# Patient Record
Sex: FEMALE | Race: WHITE | Employment: OTHER | URBAN - METROPOLITAN AREA
[De-identification: names, ages, dates, MRNs, and addresses within clinical notes are randomized per-mention and may not be internally consistent; named-entity substitution may affect disease eponyms.]

---

## 2017-04-19 ENCOUNTER — OFFICE VISIT (OUTPATIENT)
Dept: INTERNAL MEDICINE CLINIC | Age: 82
End: 2017-04-19

## 2017-04-19 VITALS
DIASTOLIC BLOOD PRESSURE: 70 MMHG | TEMPERATURE: 98.6 F | OXYGEN SATURATION: 98 % | SYSTOLIC BLOOD PRESSURE: 120 MMHG | RESPIRATION RATE: 14 BRPM | BODY MASS INDEX: 26.84 KG/M2 | HEIGHT: 67 IN | WEIGHT: 171 LBS | HEART RATE: 76 BPM

## 2017-04-19 DIAGNOSIS — C77.3 BREAST CANCER METASTASIZED TO AXILLARY LYMPH NODE, RIGHT (HCC): ICD-10-CM

## 2017-04-19 DIAGNOSIS — E55.9 VITAMIN D DEFICIENCY: ICD-10-CM

## 2017-04-19 DIAGNOSIS — Z23 ENCOUNTER FOR IMMUNIZATION: ICD-10-CM

## 2017-04-19 DIAGNOSIS — Z23 NEED FOR TDAP VACCINATION: ICD-10-CM

## 2017-04-19 DIAGNOSIS — R73.9 HYPERGLYCEMIA: ICD-10-CM

## 2017-04-19 DIAGNOSIS — Z00.00 ROUTINE GENERAL MEDICAL EXAMINATION AT A HEALTH CARE FACILITY: Primary | ICD-10-CM

## 2017-04-19 DIAGNOSIS — M15.9 PRIMARY OSTEOARTHRITIS INVOLVING MULTIPLE JOINTS: ICD-10-CM

## 2017-04-19 DIAGNOSIS — I10 ESSENTIAL HYPERTENSION: ICD-10-CM

## 2017-04-19 DIAGNOSIS — Z13.39 SCREENING FOR ALCOHOLISM: ICD-10-CM

## 2017-04-19 DIAGNOSIS — C50.911 BREAST CANCER METASTASIZED TO AXILLARY LYMPH NODE, RIGHT (HCC): ICD-10-CM

## 2017-04-19 DIAGNOSIS — C54.1 ENDOMETRIAL CANCER (HCC): ICD-10-CM

## 2017-04-19 RX ORDER — PAROXETINE HYDROCHLORIDE 20 MG/1
TABLET, FILM COATED ORAL
Qty: 90 TAB | Refills: 3 | Status: SHIPPED | OUTPATIENT
Start: 2017-04-19 | End: 2017-11-22 | Stop reason: SDUPTHER

## 2017-04-19 NOTE — PROGRESS NOTES
This is a Subsequent Medicare Annual Wellness Visit providing Personalized Prevention Plan Services (PPPS) (Performed 12 months after initial AWV and PPPS )  As well as for followup of HTN, lipids, and OA. Some increase in anxiety and now taking 20 mg of paxil daily. Some recent right hip pain on the lateral side with lying down at night or climbing steps. I have reviewed the patient's medical history in detail and updated the computerized patient record. History     Past Medical History:   Diagnosis Date    Anxiety     Anxiety state, unspecified 5/3/2010    Atypical chest pain     Cancer ([facility] [address] 1998    endometrial cancer and breast    DJD (degenerative joint disease)     GERD (gastroesophageal reflux disease)     Hypertension     Nausea & vomiting     OA (osteoarthritis) 5/3/2010    Osteoarthritis of right hip 9/20/2012    PUD (peptic ulcer disease)     BLEEDING AFTER THR    Stenosis, spinal, lumbar       Past Surgical History:   Procedure Laterality Date    BREAST SURGERY PROCEDURE UNLISTED      lumpectomy 05/22/14    HX BLADDER SUSPENSION      HX CATARACT REMOVAL Bilateral     HX DILATION AND CURETTAGE  1998    HX GYN  2003    PELVIC FLOOR RECONSTRUCTION    HX HIP REPLACEMENT  2013    Right    HX ORTHOPAEDIC Right 2012    THR    HX OTHER SURGICAL  1999    A&P REPAIR    HX SHOULDER REPLACEMENT  2/2010    Left shoulder    HX MAXIMO AND BSO  1998    HX WISDOM TEETH EXTRACTION      VASCULAR SURGERY PROCEDURE UNLIST  1966    vein stripping left leg     Current Outpatient Prescriptions   Medication Sig Dispense Refill    valsartan (DIOVAN) 80 mg tablet Take 1 Tab by mouth daily. 90 Tab 3    PARoxetine (PAXIL) 20 mg tablet TAKE 1 TABLET DAILY (Patient taking differently: Take 10 mg by mouth daily. TAKE 1 TABLET DAILY) 90 Tab 3    letrozole (FEMARA) 2.5 mg tablet Take 1 Tab by mouth daily. 90 Tab 3    ibuprofen (MOTRIN) 200 mg tablet Take  by mouth as needed.       zoledronic acid (RECLAST) 5 mg/100 mL soln 5 mg by IntraVENous route Once a year.  cholecalciferol, vitamin D3, (VITAMIN D3) 2,000 unit tab Take 4,000 mcg by mouth daily.  Calcium-Cholecalciferol, D3, (CALCIUM 500 + D) 500 mg(1,250mg) -400 unit tab Take 1 Tab by mouth two (2) times a day.  docusate sodium (COLACE) 100 mg capsule Take 300 mg by mouth daily.  INULIN (FIBER GUMMIES PO) Take  by mouth. 2 gummies daily      cyanocobalamin (VITAMIN B-12) 1,000 mcg sublingual tablet Take 2,000 mcg by mouth daily.  multivitamin (ONE A DAY) tablet Take 2 Tabs by mouth daily.  traMADol (ULTRAM) 50 mg tablet Take 1 Tab by mouth every six (6) hours as needed for Pain. Max Daily Amount: 200 mg. 30 Tab 0    Bifidobacterium Infantis (ALIGN) 4 mg cap Take 1 Cap by mouth daily.        Allergies   Allergen Reactions    Oxycontin [Oxycodone] Hives    Celebrex [Celecoxib] Hives    Ciprofloxacin Rash    E-Mycin [Erythromycin] Rash    Keflex [Cephalexin] Rash    Morphine Rash    Pcn [Penicillins] Rash    Tetracycline Rash     Family History   Problem Relation Age of Onset    Stroke Mother     Lung Disease Mother     Heart Disease Father     Cancer Other      breast    Post-op Nausea/Vomiting Other     Anesth Problems Other      PONV    Other Grandchild      GENETIC DJD    No Known Problems Daughter      Social History   Substance Use Topics    Smoking status: Never Smoker    Smokeless tobacco: Never Used    Alcohol use 0.5 - 1.0 oz/week     1 - 2 Glasses of wine per week      Comment: \"VERY LITTLE\"     Patient Active Problem List   Diagnosis Code    Essential hypertension I10    Anxiety state, unspecified F41.1    OA (osteoarthritis) M19.90    Hyperglycemia R73.9    Osteoarthritis of right hip M16.11    Vitamin D deficiency E55.9    Endometrial cancer (HCC) C54.1    Breast cancer metastasized to axillary lymph node (HCC) C50.919, C77.3    Advance directive declined by patient Z78.9 Depression Risk Factor Screening:     PHQ 2 / 9, over the last two weeks 9/9/2016   Little interest or pleasure in doing things Not at all   Feeling down, depressed or hopeless Not at all   Total Score PHQ 2 0     Alcohol Risk Factor Screening: On any occasion during the past 3 months, have you had more than 3 drinks containing alcohol? No    Do you average more than 7 drinks per week? No      Functional Ability and Level of Safety:     Hearing Loss   normal-to-mild    Activities of Daily Living   Self-care. Requires assistance with: no ADLs    Fall Risk     Fall Risk Assessment, last 12 mths 9/9/2016   Able to walk? Yes   Fall in past 12 months? Yes   Fall with injury? No   Number of falls in past 12 months 1   Fall Risk Score 1     Abuse Screen   Patient is not abused    Review of Systems   A comprehensive review of systems was negative except for that written in the HPI. Physical Examination     Evaluation of Cognitive Function:  Mood/affect:  happy  Appearance: age appropriate  Family member/caregiver input: None    Visit Vitals    /70    Pulse 76    Temp 98.6 °F (37 °C) (Oral)    Resp 14    Ht 5' 6.5\" (1.689 m)    Wt 171 lb (77.6 kg)    SpO2 98%    BMI 27.19 kg/m2     General appearance: alert, cooperative, no distress, appears stated age  Head: Normocephalic, without obvious abnormality, atraumatic  Eyes: negative  Ears: normal TM's and external ear canals AU  Nose: Nares normal. Septum midline. Mucosa normal. No drainage or sinus tenderness. Throat: Lips, mucosa, and tongue normal. Teeth and gums normal  Neck: supple, symmetrical, trachea midline, no adenopathy, thyroid: not enlarged, symmetric, no tenderness/mass/nodules, no carotid bruit and no JVD  Back: symmetric, no curvature. ROM normal. No CVA tenderness. Lungs: clear to auscultation bilaterally  Heart: regular rate and rhythm, S1, S2 normal, no murmur, click, rub or gallop  Abdomen: soft, non-tender.  Bowel sounds normal. No masses,  no organomegaly  Extremities: extremities normal, atraumatic, no cyanosis or edema  Pulses: 2+ and symmetric  Lymph nodes: Cervical, supraclavicular, and axillary nodes normal.  Neurologic: Grossly normal  Tender over the right hip bursa, no redness or warmth. Normal ROM. Patient Care Team:  Noah Holman MD as PCP - Ruthy Trinh MD (Ophthalmology)  Gonzalez Carlton RN as Nurse Navigator (Internal Medicine)  Allison Escalante MD as Physician (Hematology and Oncology)  Vianca Lopez MD as Surgeon (General Surgery)  Santo Boo MD as Physician (Dermatology)  Patti Thompson MD as Surgeon (Gastroenterology)  Gerard Hartman MD as Physician (Orthopedic Surgery)  Lorraine Howard MD (Endocrinology)    Advice/Referrals/Counseling   Education and counseling provided:  Are appropriate based on today's review and evaluation  End-of-Life planning (with patient's consent)  Pneumococcal Vaccine  Diabetes screening test      Assessment/Plan   Nurys Da Silva was seen today for annual wellness visit. Diagnoses and all orders for this visit:    Routine general medical examination at a health care facility    Need for Tdap vaccination    Encounter for immunization    Screening for alcoholism    Endometrial cancer [facility])    Breast cancer metastasized to axillary lymph node, right - will see surgery for followup. Essential hypertension - BP well controlled. -     CBC WITH AUTOMATED DIFF  -     LIPID PANEL  -     METABOLIC PANEL, COMPREHENSIVE  -     TSH RFX ON ABNORMAL TO FREE T4  -     UA/M W/RFLX CULTURE, ROUTINE    Primary osteoarthritis involving multiple joints - now with bursitis . Will work on stretches and call if not improving for ortho to inject.      Hyperglycemia - check labs and continue to work on diet and exercise.   -     HEMOGLOBIN A1C WITH EAG    Vitamin D deficiency    Other orders  -     Diphth, Pertus,Acell,, Tetanus ([facility] Cruz) 2.5-8-5 Lf-mcg-Lf/0.5mL susp susp; 0.5 mL by IntraMUSCular route once for 1 dose. Indications: DIPHTHERIA-PERTUSSIS-TETANUS COMBINED PREVENTION  -     pneumococcal 13 adenike conj dip (PREVNAR-13) 0.5 mL syrg injection; 0.5 mL by IntraMUSCular route once for 1 dose. Indications: PREVENTION OF STREPTOCOCCUS PNEUMONIAE INFECTION  -     PARoxetine (PAXIL) 20 mg tablet; TAKE 1 TABLET DAILY       Follow-up Disposition: 6 months and as needed   Advised her to call back or return to office if symptoms worsen/change/persist.  Discussed expected course/resolution/complications of diagnosis in detail with patient. Medication risks/benefits/costs/interactions/alternatives discussed with patient. She was given an after visit summary which includes diagnoses, current medications, & vitals. She expressed understanding with the diagnosis and plan. Juanito Brennan

## 2017-04-19 NOTE — MR AVS SNAPSHOT
Visit Information Date & Time Provider Department Dept. Phone Encounter #  
 4/19/2017 10:30 AM Tanya Hicks MD [facility] Internal Medicine [phone] [id number] Upcoming Health Maintenance Date Due Pneumococcal 65+ High/Highest Risk (2 of 2 - PPSV23) 1/1/2012 DTaP/Tdap/Td series (1 - Tdap) 11/16/2012 MEDICARE YEARLY EXAM 9/18/2016 GLAUCOMA SCREENING Q2Y 11/19/2017 Allergies as of 4/19/2017  Review Complete On: 4/19/2017 By: Linda Roper LPN Severity Noted Reaction Type Reactions Oxycontin [Oxycodone] High 05/03/2010   Systemic Hives Celebrex [Celecoxib]  11/17/2009    Hives Ciprofloxacin  11/17/2009    Rash  
 E-mycin [Erythromycin]  11/17/2009    Rash Keflex [Cephalexin]  11/17/2009    Rash Morphine  11/17/2009    Rash Pcn [Penicillins]  11/17/2009    Rash Tetracycline  11/17/2009    Rash Current Immunizations  Reviewed on 4/19/2017 Name Date Influenza High Dose Vaccine PF 10/26/2016 Influenza Vaccine 11/1/2014, 10/1/2013 Influenza Vaccine Split 9/22/2012 12:41 PM  
 Pneumococcal Vaccine (Unspecified Type) 1/1/2007 TD Vaccine 11/15/2012 Reviewed by Tanya Hicks MD on 4/19/2017 at 11:10 AM  
You Were Diagnosed With   
  
 Codes Comments Routine general medical examination at a health care facility    -  Primary ICD-10-CM: Z00.00 ICD-9-CM: V70.0 Need for Tdap vaccination     ICD-10-CM: A88 ICD-9-CM: V06.1 Encounter for immunization     ICD-10-CM: H72 ICD-9-CM: V03.89 Screening for alcoholism     ICD-10-CM: Z13.89 ICD-9-CM: V79.1 Endometrial cancer ([facility] [address]     ICD-10-CM: C54.1 ICD-9-CM: 182.0 Breast cancer metastasized to axillary lymph node, right     ICD-10-CM: C50.911, C77.3 ICD-9-CM: 174.9, 196.3 Essential hypertension     ICD-10-CM: I10 
ICD-9-CM: 401.9 Primary osteoarthritis involving multiple joints     ICD-10-CM: M15.0 ICD-9-CM: 715.09   
 Hyperglycemia     ICD-10-CM: R73.9 ICD-9-CM: 790.29 Vitamin D deficiency     ICD-10-CM: E55.9 ICD-9-CM: 268.9 Vitals BP Pulse Temp Resp Height(growth percentile) Weight(growth percentile) 120/70 76 98.6 °F (37 °C) (Oral) 14 5' 6.5\" (1.689 m) 171 lb (77.6 kg) SpO2 BMI OB Status Smoking Status 98% 27.19 kg/m2 Hysterectomy Never Smoker Vitals History BMI and BSA Data Body Mass Index Body Surface Area  
 27.19 kg/m 2 1.91 m 2 Preferred Pharmacy Pharmacy Name Phone [facility] [address], [facility] [address] [phone] Your Updated Medication List  
  
   
This list is accurate as of: 4/19/17 11:21 AM.  Always use your most recent med list.  
  
  
  
  
 CALCIUM 500 + D 500 mg(1,250mg) -400 unit Tab Generic drug:  Calcium-Cholecalciferol (D3) Take 1 Tab by mouth two (2) times a day. COLACE 100 mg capsule Generic drug:  docusate sodium Take 300 mg by mouth daily. Diphth, Pertus(Acell), Tetanus 2.5-8-5 Lf-mcg-Lf/0.5mL Susp susp Commonly known as:  BOOSTRIX TDAP  
0.5 mL by IntraMUSCular route once for 1 dose. Indications: DIPHTHERIA-PERTUSSIS-TETANUS COMBINED PREVENTION FIBER GUMMIES PO Take  by mouth. 2 gummies daily  
  
 ibuprofen 200 mg tablet Commonly known as:  MOTRIN Take  by mouth as needed. letrozole 2.5 mg tablet Commonly known as:  [facility] Take 1 Tab by mouth daily. multivitamin tablet Commonly known as:  ONE A DAY Take 2 Tabs by mouth daily. PARoxetine 20 mg tablet Commonly known as:  PAXIL TAKE 1 TABLET DAILY pneumococcal 13 adenike conj dip 0.5 mL Syrg injection Commonly known as:  PREVNAR-13  
0.5 mL by IntraMUSCular route once for 1 dose. Indications: PREVENTION OF STREPTOCOCCUS PNEUMONIAE INFECTION  
  
 valsartan 80 mg tablet Commonly known as:  DIOVAN Take 1 Tab by mouth daily. VITAMIN B-12 1,000 mcg sublingual tablet Generic drug:  cyanocobalamin Take 2,000 mcg by mouth daily. VITAMIN D3 2,000 unit Tab Generic drug:  cholecalciferol (vitamin D3) Take 4,000 mcg by mouth daily. zoledronic acid 5 mg/100 mL Soln Commonly known as:  RECLAST 5 mg by IntraVENous route Once a year. Prescriptions Printed Refills Galina Vigil,, Tetanus (BOOSTRIX TDAP) 2.5-8-5 Lf-mcg-Lf/0.5mL susp susp 0 Si.5 mL by IntraMUSCular route once for 1 dose. Indications: DIPHTHERIA-PERTUSSIS-TETANUS COMBINED PREVENTION Class: Print Route: IntraMUSCular  
 pneumococcal 13 adenike conj dip (PREVNAR-13) 0.5 mL syrg injection 0 Si.5 mL by IntraMUSCular route once for 1 dose. Indications: PREVENTION OF STREPTOCOCCUS PNEUMONIAE INFECTION Class: Print Route: IntraMUSCular Prescriptions Sent to Pharmacy Refills PARoxetine (PAXIL) 20 mg tablet 3 Sig: TAKE 1 TABLET DAILY Class: Normal  
 Pharmacy: [facility] [address] [place] [address] AT [address] #: [phone] We Performed the Following CBC WITH AUTOMATED DIFF [15448 CPT(R)] HEMOGLOBIN A1C WITH EAG [14430 CPT(R)] LIPID PANEL [39003 CPT(R)] METABOLIC PANEL, COMPREHENSIVE [77824 CPT(R)] TSH RFX ON ABNORMAL TO FREE T4 [[id number] Custom] UA/M W/RFLX CULTURE, ROUTINE [[id number] Custom] Patient Instructions Please remember to bring a copy of your advance medical directive with you to your next appointment so that we may update your chart with this important information. Thank you. Medicare Wellness Visit, Female The best way to live healthy is to have a healthy lifestyle by eating a well-balanced diet, exercising regularly, limiting alcohol and stopping smoking.  
 
Regular physical exams and screening tests are another way to keep healthy. Preventive exams provided by your health care provider can find health problems before they become diseases or illnesses. Preventive services including immunizations, screening tests, monitoring and exams can help you take care of your own health. All people over age 72 should have a pneumovax  and and a prevnar shot to prevent pneumonia. These are once in a lifetime unless you and your provider decide differently. All people over 65 should have a yearly flu shot and a tetanus vaccine every 10 years. A bone mass density to screen for osteoporosis or thinning of the bones should be done every 2 years after 65. Screening for diabetes mellitus with a blood sugar test should be done every year. Glaucoma is a disease of the eye due to increased ocular pressure that can lead to blindness and it should be done every year by an eye professional. 
 
Cardiovascular screening tests that check for elevated lipids (fatty part of blood) which can lead to heart disease and strokes should be done every 5 years. Colorectal screening that evaluates for blood or polyps in your colon should be done yearly as a stool test or every five years as a flexible sigmoidoscope or every 10 years as a colonoscopy up to age 76. Breast cancer screening with a mammogram is recommended biennially  for women age 54-69. Screening for cervical cancer with a pap smear and pelvic exam is recommended for women after age 72 years every 2 years up to age 79 or when the provider and patient decide to stop. If there is a history of cervical abnormalities or other increased risk for cancer then the test is recommended yearly. Hepatitis C screening is also recommended for anyone born between [birth year] through [address]. A shingles vaccine is also recommended once in a lifetime after age 61. Your Medicare Wellness Exam is recommended annually. Here is a list of your current Health Maintenance items with a due date: Health Maintenance Due Topic Date Due  Pneumococcal Vaccine (2 of 2 - PPSV23) 01/01/2012  DTaP/Tdap/Td  (1 - Tdap) 11/16/2012 [address] [place] [address] Annual Well Visit  09/18/2016 Hip Bursitis: Care Instructions Your Care Instructions Bursitis is inflammation of the bursa. A bursa is a small sac of fluid that cushions a joint and helps it move easily. A bursa sits between a bone in the hip and the muscles and tendons in the thigh and buttock. Injury or overuse of the hip can cause bursitis. Activities that can lead to bursitis include twisting and rapid joint movement. Bursitis can cause hip pain. Bursitis usually gets better if you avoid the activity that caused it. If pain lasts or gets worse despite home treatment, your doctor may draw fluid from the bursa through a needle. This may relieve your pain and help your doctor know if you have an infection. If so, your doctor will prescribe antibiotics. If you have inflammation only, you may get a corticosteroid shot to reduce swelling and pain. Sometimes surgery is needed to drain or remove the bursa. Follow-up care is a key part of your treatment and safety. Be sure to make and go to all appointments, and call your doctor if you are having problems. It's also a good idea to know your test results and keep a list of the medicines you take. How can you care for yourself at home? · Put ice or a cold pack on your hip for 10 to 20 minutes at a time. Put a thin cloth between the ice and your skin. · After 3 days of using ice, you may use heat on your hip. You can use a hot water bottle, a heating pad set on low, or a warm, moist towel. · Rest your hip. Stop any activities that cause pain. Switch to activities that do not stress your hip. · Take your medicines exactly as prescribed. Call your doctor if you think you are having a problem with your medicine.  
· Ask your doctor if you can take an over-the-counter pain medicine, such as acetaminophen (Tylenol), ibuprofen (Advil, Motrin), or naproxen (Aleve). Be safe with medicines. Read and follow all instructions on the label. · To prevent stiffness, gently move the hip joint as much as you can without pain every day. As the pain gets better, keep doing range-of-motion exercises. Ask your doctor for exercises that will make the muscles around the hip joint stronger. Do these as directed. · You can slowly return to the activity that caused the pain, but do it with less effort until you can do it without pain or swelling. Be sure to warm up before and stretch after you do the activity. When should you call for help? Call your doctor now or seek immediate medical care if: 
· You have a fever. · You have increased swelling or redness in your hip. · You cannot use your hip, or the pain in your hip gets worse. Watch closely for changes in your health, and be sure to contact your doctor if: 
· You have pain for 2 weeks or longer despite home treatment. Where can you learn more? Go to http://daniele-elmer.info/. Enter C529 in the search box to learn more about \"Hip Bursitis: Care Instructions. \" Current as of: May 23, 2016 Content Version: 11.2 © 1830-2772 [employer]. Care instructions adapted under license by [employer] (which disclaims liability or warranty for this information). If you have questions about a medical condition or this instruction, always ask your healthcare professional. [place] [address] any warranty or liability for your use of this information. Hip Bursitis: Exercises Your Care Instructions Here are some examples of typical rehabilitation exercises for your condition. Start each exercise slowly. Ease off the exercise if you start to have pain. Your doctor or physical therapist will tell you when you can start these exercises and which ones will work best for you. How to do the exercises Hip rotator stretch 1. Lie on your back with both knees bent and your feet flat on the floor. 2. Put the ankle of your affected leg on your opposite thigh near your knee. 3. Use your hand to gently push your knee away from your body until you feel a gentle stretch around your hip. 4. Hold the stretch for 15 to 30 seconds. 5. Repeat 2 to 4 times. 6. Repeat steps 1 through 5, but this time use your hand to gently pull your knee toward your opposite shoulder. Iliotibial band stretch 1. Lean sideways against a wall. If you are not steady on your feet, hold on to a chair or counter. 2. Stand on the leg with the affected hip, with that leg close to the wall. Then cross your other leg in front of it. 3. Let your affected hip drop out to the side of your body and against wall. Then lean away from your affected hip until you feel a stretch. 4. Hold the stretch for 15 to 30 seconds. 5. Repeat 2 to 4 times. Straight-leg raises to the outside 1. Lie on your side, with your affected hip on top. 2. Tighten the front thigh muscles of your top leg to keep your knee straight. 3. Keep your hip and your leg straight in line with the rest of your body, and keep your knee pointing forward. Do not drop your hip back. 4. Lift your top leg straight up toward the ceiling, about 12 inches off the floor. Hold for about 6 seconds, then slowly lower your leg. 5. Repeat 8 to 12 times. Clamshell 1. Lie on your side, with your affected hip on top and your head propped on a pillow. Keep your feet and knees together and your knees bent. 2. Raise your top knee, but keep your feet together. Do not let your hips roll back. Your legs should open up like a clamshell. 3. Hold for 6 seconds. 4. Slowly lower your knee back down. Rest for 10 seconds. 5. Repeat 8 to 12 times. Follow-up care is a key part of your treatment and safety.  Be sure to make and go to all appointments, and call your doctor if you are having problems. It's also a good idea to know your test results and keep a list of the medicines you take. Where can you learn more? Go to http://daniele-elmer.info/. Enter B747 in the search box to learn more about \"Hip Bursitis: Exercises. \" Current as of: May 23, 2016 Content Version: 11.2 © 0971-3944 [employer]. Care instructions adapted under license by [employer] (which disclaims liability or warranty for this information). If you have questions about a medical condition or this instruction, always ask your healthcare professional. [address] any warranty or liability for your use of this information. Introducing [facility] & HEALTH SERVICES! Dear Claudia Lee: Thank you for requesting a Call Britannia account. Our records indicate that you already have an active Call Britannia account. You can access your account anytime at [email or web address] Did you know that you can access your hospital and ER discharge instructions at any time in Call Britannia? You can also review all of your test results from your hospital stay or ER visit. Additional Information If you have questions, please visit the Frequently Asked Questions section of the Call Britannia website at [email or web address] Remember, Call Britannia is NOT to be used for urgent needs. For medical emergencies, dial 911. Now available from your iPhone and Android! Please provide this summary of care documentation to your next provider. Your primary care clinician is listed as [id number] If you have any questions after today's visit, please call [phone].

## 2017-04-19 NOTE — PATIENT INSTRUCTIONS
Please remember to bring a copy of your advance medical directive with you to your next appointment so that we may update your chart with this important information. Thank you. Medicare Wellness Visit, Female    The best way to live healthy is to have a healthy lifestyle by eating a well-balanced diet, exercising regularly, limiting alcohol and stopping smoking. Regular physical exams and screening tests are another way to keep healthy. Preventive exams provided by your health care provider can find health problems before they become diseases or illnesses. Preventive services including immunizations, screening tests, monitoring and exams can help you take care of your own health. All people over age 72 should have a pneumovax  and and a prevnar shot to prevent pneumonia. These are once in a lifetime unless you and your provider decide differently. All people over 65 should have a yearly flu shot and a tetanus vaccine every 10 years. A bone mass density to screen for osteoporosis or thinning of the bones should be done every 2 years after 65. Screening for diabetes mellitus with a blood sugar test should be done every year. Glaucoma is a disease of the eye due to increased ocular pressure that can lead to blindness and it should be done every year by an eye professional.    Cardiovascular screening tests that check for elevated lipids (fatty part of blood) which can lead to heart disease and strokes should be done every 5 years. Colorectal screening that evaluates for blood or polyps in your colon should be done yearly as a stool test or every five years as a flexible sigmoidoscope or every 10 years as a colonoscopy up to age 76. Breast cancer screening with a mammogram is recommended biennially  for women age 54-69.     Screening for cervical cancer with a pap smear and pelvic exam is recommended for women after age 72 years every 2 years up to age 79 or when the provider and patient decide to stop. If there is a history of cervical abnormalities or other increased risk for cancer then the test is recommended yearly. Hepatitis C screening is also recommended for anyone born between [birth year] through [address]. A shingles vaccine is also recommended once in a lifetime after age 61. Your Medicare Wellness Exam is recommended annually. Here is a list of your current Health Maintenance items with a due date:  Health Maintenance Due   Topic Date Due    Pneumococcal Vaccine (2 of 2 - PPSV23) 01/01/2012    DTaP/Tdap/Td  (1 - Tdap) 11/16/2012    Annual Well Visit  09/18/2016          Hip Bursitis: Care Instructions  Your Care Instructions    Bursitis is inflammation of the bursa. A bursa is a small sac of fluid that cushions a joint and helps it move easily. A bursa sits between a bone in the hip and the muscles and tendons in the thigh and buttock. Injury or overuse of the hip can cause bursitis. Activities that can lead to bursitis include twisting and rapid joint movement. Bursitis can cause hip pain. Bursitis usually gets better if you avoid the activity that caused it. If pain lasts or gets worse despite home treatment, your doctor may draw fluid from the bursa through a needle. This may relieve your pain and help your doctor know if you have an infection. If so, your doctor will prescribe antibiotics. If you have inflammation only, you may get a corticosteroid shot to reduce swelling and pain. Sometimes surgery is needed to drain or remove the bursa. Follow-up care is a key part of your treatment and safety. Be sure to make and go to all appointments, and call your doctor if you are having problems. It's also a good idea to know your test results and keep a list of the medicines you take. How can you care for yourself at home? · Put ice or a cold pack on your hip for 10 to 20 minutes at a time. Put a thin cloth between the ice and your skin.   · After 3 days of using ice, you may use heat on your hip. You can use a hot water bottle, a heating pad set on low, or a warm, moist towel. · Rest your hip. Stop any activities that cause pain. Switch to activities that do not stress your hip. · Take your medicines exactly as prescribed. Call your doctor if you think you are having a problem with your medicine. · Ask your doctor if you can take an over-the-counter pain medicine, such as acetaminophen (Tylenol), ibuprofen (Advil, Motrin), or naproxen (Aleve). Be safe with medicines. Read and follow all instructions on the label. · To prevent stiffness, gently move the hip joint as much as you can without pain every day. As the pain gets better, keep doing range-of-motion exercises. Ask your doctor for exercises that will make the muscles around the hip joint stronger. Do these as directed. · You can slowly return to the activity that caused the pain, but do it with less effort until you can do it without pain or swelling. Be sure to warm up before and stretch after you do the activity. When should you call for help? Call your doctor now or seek immediate medical care if:  · You have a fever. · You have increased swelling or redness in your hip. · You cannot use your hip, or the pain in your hip gets worse. Watch closely for changes in your health, and be sure to contact your doctor if:  · You have pain for 2 weeks or longer despite home treatment. Where can you learn more? Go to http://daniele-elmer.info/. Enter J166 in the search box to learn more about \"Hip Bursitis: Care Instructions. \"  Current as of: May 23, 2016  Content Version: 11.2  © 9292-0173 [employer]. Care instructions adapted under license by [employer] (which disclaims liability or warranty for this information).  If you have questions about a medical condition or this instruction, always ask your healthcare professional. [address] any warranty or liability for your use of this information. Hip Bursitis: Exercises  Your Care Instructions  Here are some examples of typical rehabilitation exercises for your condition. Start each exercise slowly. Ease off the exercise if you start to have pain. Your doctor or physical therapist will tell you when you can start these exercises and which ones will work best for you. How to do the exercises  Hip rotator stretch    1. Lie on your back with both knees bent and your feet flat on the floor. 2. Put the ankle of your affected leg on your opposite thigh near your knee. 3. Use your hand to gently push your knee away from your body until you feel a gentle stretch around your hip. 4. Hold the stretch for 15 to 30 seconds. 5. Repeat 2 to 4 times. 6. Repeat steps 1 through 5, but this time use your hand to gently pull your knee toward your opposite shoulder. Iliotibial band stretch    1. Lean sideways against a wall. If you are not steady on your feet, hold on to a chair or counter. 2. Stand on the leg with the affected hip, with that leg close to the wall. Then cross your other leg in front of it. 3. Let your affected hip drop out to the side of your body and against wall. Then lean away from your affected hip until you feel a stretch. 4. Hold the stretch for 15 to 30 seconds. 5. Repeat 2 to 4 times. Straight-leg raises to the outside    1. Lie on your side, with your affected hip on top. 2. Tighten the front thigh muscles of your top leg to keep your knee straight. 3. Keep your hip and your leg straight in line with the rest of your body, and keep your knee pointing forward. Do not drop your hip back. 4. Lift your top leg straight up toward the ceiling, about 12 inches off the floor. Hold for about 6 seconds, then slowly lower your leg. 5. Repeat 8 to 12 times. Clamshell    1. Lie on your side, with your affected hip on top and your head propped on a pillow.  Keep your feet and knees together and your knees bent.  2. Raise your top knee, but keep your feet together. Do not let your hips roll back. Your legs should open up like a clamshell. 3. Hold for 6 seconds. 4. Slowly lower your knee back down. Rest for 10 seconds. 5. Repeat 8 to 12 times. Follow-up care is a key part of your treatment and safety. Be sure to make and go to all appointments, and call your doctor if you are having problems. It's also a good idea to know your test results and keep a list of the medicines you take. Where can you learn more? Go to http://daniele-elmer.info/. Enter O380 in the search box to learn more about \"Hip Bursitis: Exercises. \"  Current as of: May 23, 2016  Content Version: 11.2  © 9093-5504 [employer]. Care instructions adapted under license by [employer] (which disclaims liability or warranty for this information). If you have questions about a medical condition or this instruction, always ask your healthcare professional. [address] any warranty or liability for your use of this information.

## 2017-04-19 NOTE — PROGRESS NOTES
Chief Complaint   Patient presents with   [facility] Annual Wellness Visit     Goals that were addressed/or need to be completed after this visit:  Health Maintenance Due   Topic    Pneumococcal 65+ High/Highest Risk (2 of 2 - PPSV23)    DTaP/Tdap/Td series (1 - Tdap)    MEDICARE YEARLY EXAM

## 2017-04-26 ENCOUNTER — HOSPITAL ENCOUNTER (OUTPATIENT)
Dept: LAB | Age: 82
Discharge: HOME | End: 2017-04-26
Payer: MEDICARE

## 2017-04-26 PROCEDURE — 80053 COMPREHEN METABOLIC PANEL: CPT

## 2017-04-26 PROCEDURE — 80061 LIPID PANEL: CPT

## 2017-04-26 PROCEDURE — 84443 ASSAY THYROID STIM HORMONE: CPT

## 2017-04-26 PROCEDURE — 87086 URINE CULTURE/COLONY COUNT: CPT

## 2017-04-26 PROCEDURE — 36415 COLL VENOUS BLD VENIPUNCTURE: CPT

## 2017-04-26 PROCEDURE — 83036 HEMOGLOBIN GLYCOSYLATED A1C: CPT

## 2017-04-26 PROCEDURE — 85025 COMPLETE CBC W/AUTO DIFF WBC: CPT

## 2017-04-26 PROCEDURE — 81001 URINALYSIS AUTO W/SCOPE: CPT

## 2017-04-28 LAB
ALBUMIN SERPL-MCNC: 4.4 G/DL (ref 3.5–4.7)
ALBUMIN/GLOB SERPL: 1.6 {RATIO} (ref 1.2–2.2)
ALP SERPL-CCNC: 63 IU/L (ref 39–117)
ALT SERPL-CCNC: 15 IU/L (ref 0–32)
APPEARANCE UR: CLEAR
AST SERPL-CCNC: 22 IU/L (ref 0–40)
BACTERIA #/AREA URNS HPF: NORMAL /[HPF]
BACTERIA UR CULT: NO GROWTH
BASOPHILS # BLD AUTO: 0 X10E3/UL (ref 0–0.2)
BASOPHILS NFR BLD AUTO: 0 %
BILIRUB SERPL-MCNC: 0.4 MG/DL (ref 0–1.2)
BILIRUB UR QL STRIP: NEGATIVE
BUN SERPL-MCNC: 12 MG/DL (ref 8–27)
BUN/CREAT SERPL: 14 (ref 12–28)
CALCIUM SERPL-MCNC: 9 MG/DL (ref 8.7–10.3)
CASTS URNS QL MICRO: NORMAL /LPF
CHLORIDE SERPL-SCNC: 99 MMOL/L (ref 96–106)
CHOLEST SERPL-MCNC: 178 MG/DL (ref 100–199)
CO2 SERPL-SCNC: 22 MMOL/L (ref 18–29)
COLOR UR: YELLOW
CREAT SERPL-MCNC: 0.84 MG/DL (ref 0.57–1)
EOSINOPHIL # BLD AUTO: 0.1 X10E3/UL (ref 0–0.4)
EOSINOPHIL NFR BLD AUTO: 2 %
EPI CELLS #/AREA URNS HPF: NORMAL /HPF
ERYTHROCYTE [DISTWIDTH] IN BLOOD BY AUTOMATED COUNT: 14.7 % (ref 12.3–15.4)
EST. AVERAGE GLUCOSE BLD GHB EST-MCNC: 126 MG/DL
GLOBULIN SER CALC-MCNC: 2.7 G/DL (ref 1.5–4.5)
GLUCOSE SERPL-MCNC: 94 MG/DL (ref 65–99)
GLUCOSE UR QL: NEGATIVE
HBA1C MFR BLD: 6 % (ref 4.8–5.6)
HCT VFR BLD AUTO: 36.4 % (ref 34–46.6)
HDLC SERPL-MCNC: 67 MG/DL
HGB BLD-MCNC: 12.3 G/DL (ref 11.1–15.9)
HGB UR QL STRIP: ABNORMAL
IMM GRANULOCYTES # BLD: 0 X10E3/UL (ref 0–0.1)
IMM GRANULOCYTES NFR BLD: 0 %
KETONES UR QL STRIP: NEGATIVE
LDLC SERPL CALC-MCNC: 92 MG/DL (ref 0–99)
LEUKOCYTE ESTERASE UR QL STRIP: ABNORMAL
LYMPHOCYTES # BLD AUTO: 1.4 X10E3/UL (ref 0.7–3.1)
LYMPHOCYTES NFR BLD AUTO: 29 %
MCH RBC QN AUTO: 29.2 PG (ref 26.6–33)
MCHC RBC AUTO-ENTMCNC: 33.8 G/DL (ref 31.5–35.7)
MCV RBC AUTO: 87 FL (ref 79–97)
MICRO URNS: ABNORMAL
MONOCYTES # BLD AUTO: 0.5 X10E3/UL (ref 0.1–0.9)
MONOCYTES NFR BLD AUTO: 9 %
MUCOUS THREADS URNS QL MICRO: PRESENT
NEUTROPHILS # BLD AUTO: 2.9 X10E3/UL (ref 1.4–7)
NEUTROPHILS NFR BLD AUTO: 60 %
NITRITE UR QL STRIP: NEGATIVE
PH UR STRIP: 6.5 [PH] (ref 5–7.5)
PLATELET # BLD AUTO: 286 X10E3/UL (ref 150–379)
POTASSIUM SERPL-SCNC: 4.4 MMOL/L (ref 3.5–5.2)
PROT SERPL-MCNC: 7.1 G/DL (ref 6–8.5)
PROT UR QL STRIP: NEGATIVE
RBC # BLD AUTO: 4.21 X10E6/UL (ref 3.77–5.28)
RBC #/AREA URNS HPF: NORMAL /HPF
SODIUM SERPL-SCNC: 137 MMOL/L (ref 134–144)
SP GR UR: 1.01 (ref 1–1.03)
TRIGL SERPL-MCNC: 96 MG/DL (ref 0–149)
TSH SERPL DL<=0.005 MIU/L-ACNC: 2.33 UIU/ML (ref 0.45–4.5)
URINALYSIS REFLEX, 377202: ABNORMAL
UROBILINOGEN UR STRIP-MCNC: 0.2 MG/DL (ref 0.2–1)
VLDLC SERPL CALC-MCNC: 19 MG/DL (ref 5–40)
WBC # BLD AUTO: 4.9 X10E3/UL (ref 3.4–10.8)
WBC #/AREA URNS HPF: NORMAL /HPF

## 2017-08-21 ENCOUNTER — TELEPHONE (OUTPATIENT)
Dept: INTERNAL MEDICINE CLINIC | Age: 82
End: 2017-08-21

## 2017-09-27 RX ORDER — OMEPRAZOLE 20 MG/1
CAPSULE, DELAYED RELEASE ORAL
Qty: 90 CAP | Refills: 3 | Status: SHIPPED | OUTPATIENT
Start: 2017-09-27 | End: 2018-09-22 | Stop reason: SDUPTHER

## 2017-11-22 RX ORDER — PAROXETINE HYDROCHLORIDE 20 MG/1
TABLET, FILM COATED ORAL
Qty: 90 TAB | Refills: 3 | Status: SHIPPED | OUTPATIENT
Start: 2017-11-22 | End: 2018-12-19 | Stop reason: SDUPTHER

## 2017-12-08 RX ORDER — VALSARTAN 80 MG/1
TABLET ORAL
Qty: 90 TAB | Refills: 3 | Status: SHIPPED | OUTPATIENT
Start: 2017-12-08 | End: 2020-09-11 | Stop reason: ALTCHOICE

## 2018-09-23 RX ORDER — OMEPRAZOLE 20 MG/1
CAPSULE, DELAYED RELEASE ORAL
Qty: 90 CAP | Refills: 3 | Status: SHIPPED | OUTPATIENT
Start: 2018-09-23 | End: 2021-09-02 | Stop reason: ALTCHOICE

## 2018-12-19 ENCOUNTER — OFFICE VISIT (OUTPATIENT)
Dept: INTERNAL MEDICINE CLINIC | Age: 83
End: 2018-12-19

## 2018-12-19 VITALS
SYSTOLIC BLOOD PRESSURE: 101 MMHG | RESPIRATION RATE: 16 BRPM | BODY MASS INDEX: 27.64 KG/M2 | DIASTOLIC BLOOD PRESSURE: 69 MMHG | HEIGHT: 66 IN | WEIGHT: 172 LBS | OXYGEN SATURATION: 96 % | HEART RATE: 98 BPM

## 2018-12-19 DIAGNOSIS — R41.3 MEMORY LOSS: ICD-10-CM

## 2018-12-19 DIAGNOSIS — M15.9 PRIMARY OSTEOARTHRITIS INVOLVING MULTIPLE JOINTS: ICD-10-CM

## 2018-12-19 DIAGNOSIS — Z00.00 MEDICARE ANNUAL WELLNESS VISIT, SUBSEQUENT: Primary | ICD-10-CM

## 2018-12-19 DIAGNOSIS — Z71.89 ADVANCE CARE PLANNING: ICD-10-CM

## 2018-12-19 DIAGNOSIS — C54.1 ENDOMETRIAL CANCER (HCC): ICD-10-CM

## 2018-12-19 DIAGNOSIS — I10 ESSENTIAL HYPERTENSION: ICD-10-CM

## 2018-12-19 DIAGNOSIS — Z13.39 SCREENING FOR ALCOHOLISM: ICD-10-CM

## 2018-12-19 DIAGNOSIS — C77.3 CARCINOMA OF RIGHT BREAST METASTATIC TO AXILLARY LYMPH NODE (HCC): ICD-10-CM

## 2018-12-19 DIAGNOSIS — R73.9 HYPERGLYCEMIA: ICD-10-CM

## 2018-12-19 DIAGNOSIS — Z13.31 SCREENING FOR DEPRESSION: ICD-10-CM

## 2018-12-19 DIAGNOSIS — C50.911 CARCINOMA OF RIGHT BREAST METASTATIC TO AXILLARY LYMPH NODE (HCC): ICD-10-CM

## 2018-12-19 RX ORDER — DICLOFENAC SODIUM AND MISOPROSTOL 50; 200 MG/1; UG/1
1 TABLET, DELAYED RELEASE ORAL 2 TIMES DAILY
Qty: 60 TAB | Refills: 3 | Status: SHIPPED | OUTPATIENT
Start: 2018-12-19 | End: 2021-09-02 | Stop reason: ALTCHOICE

## 2018-12-19 RX ORDER — PAROXETINE HYDROCHLORIDE 20 MG/1
TABLET, FILM COATED ORAL
Qty: 90 TAB | Refills: 3 | Status: SHIPPED | OUTPATIENT
Start: 2018-12-19 | End: 2019-06-18 | Stop reason: SDUPTHER

## 2018-12-19 NOTE — PATIENT INSTRUCTIONS
Today you had a Medicare Wellness Visit. During this visit, we developed and/or updated your personalized health plan to prevent disease and disability based on your current health and risk factors. Please schedule an appt around this time next year so we can continue to keep you on the right path to living a healthy lifestyle. Schedule of Personalized Health Plan    The best way to stay healthy is to live a healthy lifestyle. A healthy lifestyle includes regular exercise, eating a well-balanced diet, keeping a healthy weight and not smoking. Regular physical exams and screening tests are another important way to take care of yourself. Preventive exams provided by health care providers can find health problems early when treatment works best and can keep you from getting certain diseases or illnesses. Preventive services include exams, lab tests, screenings, shots, monitoring and information to help you take care of your own health. All people over 65 should have a pneumonia shot. Pneumonia shots are usually only needed once in a lifetime unless your doctor decides differently. All people over 65 should have a yearly flu shot. People over 65 are at medium to high risk for Hepatitis B. Three shots are needed for complete protection. For additional information, please discuss with physician. In addition to your physical exam, some screening tests are recommended:    Bone mass measurement (dexa scan) is recommended every  two years. Diabetes Mellitus screening is recommended every year. Glaucoma is an eye disease caused by high pressure in the eye. An eye exam is recommended every year. Cardiovascular screening tests that check your cholesterol and other blood fat (lipid) levels are recommended every five years. Colorectal Cancer screening tests help to find pre-cancerous polyps (growths in the colon) so they can be removed before they turn into cancer.   Tests ordered for screening depend on your personal and family history risk factors. Mammogram screening for Breast Cancer is recommended yearly. Screening for Cervical Cancer is recommended every two years (annually for certain risk factors, such as previous history of STD or abnormal PAP in past 7 years).     Here is a list of your current Health Maintenance items with a due date:  Health Maintenance   Topic Date Due    Shingrix Vaccine Age 49> (1 of 2) 09/12/1986    Pneumococcal 65+ High/Highest Risk (2 of 2 - PPSV23) 01/01/2012    DTaP/Tdap/Td series (1 - Tdap) 11/16/2012    GLAUCOMA SCREENING Q2Y  11/19/2017    MEDICARE YEARLY EXAM  04/20/2018    Influenza Age 9 to Adult  08/01/2018    Bone Densitometry (Dexa) Screening  Completed

## 2018-12-19 NOTE — PROGRESS NOTES
Apryl Gramajo is a 81 y.o. female and presents for Annual Medicare Wellness Visit. Assessment of cognitive impairment: Alert and oriented x 3. Abuse Screen:    Abuse Screening Questionnaire 12/19/2018   Do you ever feel afraid of your partner? N   Are you in a relationship with someone who physically or mentally threatens you? N   Is it safe for you to go home? Y       Depression Screen:   PHQ over the last two weeks 12/19/2018   Little interest or pleasure in doing things Not at all   Feeling down, depressed, irritable, or hopeless Not at all   Total Score PHQ 2 0       Fall Risk Assessment:    Fall Risk Assessment, last 12 mths 12/19/2018   Able to walk? Yes   Fall in past 12 months? No   Fall with injury? -   Number of falls in past 12 months -   Fall Risk Score -       Activities of Daily Living:    ADL Assessment 12/19/2018   Feeding yourself No Help Needed   Getting from bed to chair No Help Needed   Getting dressed No Help Needed   Bathing or showering No Help Needed   Walk across the room (includes cane/walker) No Help Needed   Using the telphone No Help Needed   Taking your medications No Help Needed   Preparing meals No Help Needed   Managing money (expenses/bills) No Help Needed   Moderately strenuous housework (laundry) No Help Needed   Shopping for personal items (toiletries/medicines) No Help Needed   Shopping for groceries No Help Needed   Driving No Help Needed   Climbing a flight of stairs No Help Needed   Getting to places beyond walking distances No Help Needed       Health Maintenance:  Daily Low Dose Aspirin: no  Bone Density: handled by endocrinology, UTD  Glaucoma Screening: UTD, records requested from [address] [facility] [address]:    Tetanus: TD 11/15/12. Influenza: up to date Oct 2018. Shingles:  Zostavax: unknown. Shingrix:patient declines   Pneumovax:  up to date 2007. Prevnar: up to date records requested from [address] [place] [address].   Cancer screening:    Cervical: NA.  Breast: up to date 2018 records requested from [facility]. Colon: NA.  Prostate:  NA    Advance Care Planning:   End of Life Planning: has an advanced directive - a copy HAS NOT been provided. .  Provided pt with \"Respecting Choices packet of Information\" no  Offered facilitator session with NN no     Medications/Allergies: Reviewed with patient  Prior to Admission medications    Medication Sig Start Date End Date Taking? Authorizing Provider   PARoxetine (PAXIL) 20 mg tablet TAKE 1 TABLET DAILY 12/19/18  Yes Fermin Best MD   diclofenac-miSOPROStol (ARTHROTEC 50)  mg-mcg per tablet Take 1 Tab by mouth two (2) times a day. 12/19/18  Yes Fermin Best MD   omeprazole (PRILOSEC) 20 mg capsule TAKE 1 CAPSULE DAILY 9/23/18  Yes Fermin Best MD   valsartan (DIOVAN) 80 mg tablet TAKE 1 TABLET DAILY 12/8/17  Yes Simin Theodore III, MD   letrozole [facility]) 2.5 mg tablet Take 1 Tab by mouth daily. 10/20/16  Yes Fermin Best MD   zoledronic acid (RECLAST) 5 mg/100 mL soln 5 mg by IntraVENous route Once a year. Yes Provider, Historical   cholecalciferol, vitamin D3, (VITAMIN D3) 2,000 unit tab Take 4,000 mcg by mouth daily. Yes Provider, Historical   INULIN (FIBER GUMMIES PO) Take  by mouth. 2 gummies daily   Yes Provider, Historical   cyanocobalamin (VITAMIN B-12) 1,000 mcg sublingual tablet Take 2,000 mcg by mouth daily. Yes Provider, Historical   multivitamin (ONE A DAY) tablet Take 2 Tabs by mouth daily. Yes Provider, Historical   Calcium-Cholecalciferol, D3, (CALCIUM 500 + D) 500 mg(1,250mg) -400 unit tab Take 1 Tab by mouth two (2) times a day. 9/18/15   Simin Theodore III, MD   docusate sodium (COLACE) 100 mg capsule Take 300 mg by mouth daily.     Provider, Historical     Allergies   Allergen Reactions    Oxycontin [Oxycodone] Hives    Celebrex [Celecoxib] Hives    Ciprofloxacin Rash    E-Mycin [Erythromycin] Rash    Keflex [Cephalexin] Rash    Morphine Rash    Pcn [Penicillins] Rash    Tetracycline Rash       PSH: Reviewed with patient  Past Surgical History:   Procedure Laterality Date    BREAST SURGERY PROCEDURE UNLISTED      lumpectomy 05/22/14    HX BLADDER SUSPENSION      HX CATARACT REMOVAL Bilateral     HX DILATION AND CURETTAGE  1998    HX GYN  2003    PELVIC FLOOR RECONSTRUCTION    HX HIP REPLACEMENT  2013    Right    HX ORTHOPAEDIC Right 2012    THR    HX OTHER SURGICAL  1999    A&P REPAIR    HX SHOULDER REPLACEMENT  2/2010    Left shoulder    HX MAXIMO AND BSO  1998    HX WISDOM TEETH EXTRACTION      VASCULAR SURGERY PROCEDURE UNLIST  1966    vein stripping left leg        SH: Reviewed with patient  Social History     Tobacco Use    Smoking status: Never Smoker    Smokeless tobacco: Never Used   Substance Use Topics    Alcohol use: Yes     Alcohol/week: 0.5 - 1.0 oz     Types: 1 - 2 Glasses of wine per week     Comment: \"VERY LITTLE\"    Drug use: No       FH: Reviewed with patient  Family History   Problem Relation Age of Onset    Stroke Mother     Lung Disease Mother     Heart Disease Father     Cancer Other         breast    Post-op Nausea/Vomiting Other     Anesth Problems Other         PONV    Other Grandchild         [employer] DJD    No Known Problems Daughter          Objective:  Visit Vitals  /84   Pulse 98   Resp 16   Ht 5' 6\" (1.676 m)   Wt 172 lb (78 kg)   SpO2 96%   BMI 27.76 kg/m²    Body mass index is 27.76 kg/m². Alcohol Risk Screen:   On any occasion during past 3 months, have you had more than 3 drinks (female) or 4 drinks (male) containing alcohol? No  Do you average more than 7 drinks (female) or 14 drinks (male) per week?   No  Type and Amount: drinks very rarely    Tobacco Abuse:  No    Nutrition Screen:  No concerns, other than eating too many desserts at new assisted living, discussed strategies to avoid/eat less    Hearing Loss:  wears hearing aides    Vision Loss:   Wears glasses, contact lenses, or have any other visual impairment glasses    Activities of Daily Living:  Self-care. Requires assistance with: no ADLs  Patient handle his/her own medications  yes    Current medical providers:    Patient Care Team:  Pete Patrick MD as PCP - General  Nino Parekh MD (Ophthalmology)  Vida Akers MD as Physician (Hematology and Oncology)  Berenice Castro MD as Surgeon (General Surgery)  Chasidy Chandler MD as Physician (Dermatology)  Lynn Ramsay MD as Surgeon (Gastroenterology)  Rosa Kitchen MD as Physician (Orthopedic Surgery)  Virginia Constantino MD (Endocrinology)      Plan:      Orders Placed This Encounter    CBC WITH AUTOMATED DIFF    HEMOGLOBIN A1C WITH EAG    LIPID PANEL    METABOLIC PANEL, COMPREHENSIVE    TSH RFX ON ABNORMAL TO FREE T4    UA/M W/RFLX CULTURE, ROUTINE    VITAMIN B12    diclofenac-miSOPROStol (ARTHROTEC 50)  mg-mcg per tablet       Health Maintenance   Topic Date Due    Pneumococcal 65+ High/Highest Risk (2 of 2 - PPSV23) 01/01/2012    DTaP/Tdap/Td series (1 - Tdap) 11/16/2012    GLAUCOMA SCREENING Q2Y  11/19/2017    Influenza Age 9 to Adult  08/01/2018    Shingrix Vaccine Age 50> (1 of 2) 12/19/2019 (Originally 9/12/1986)   [id number] [place] [address]  12/20/2019    Bone Densitometry (Dexa) Screening  Completed       *Patient verbalized understanding and agreement with the plan. A copy of the After Visit Summary with personalized health plan was given to the patient today. Physical Exam will be performed by PCP and documented under a separate Progress Note.

## 2018-12-20 NOTE — PROGRESS NOTES
HPI:  Elizabeth Rivas is a 81y.o. year old female who is here for a routine visit:    Patient is seen for her wellness visit as well as for routine follow-up visit. She was seen in consultation with the nurse navigator. History was reviewed and documented. I concur with the navigators findings. She is been generally feeling well. Has been stressed with issues with her . She has noted some issues with her memory and she may be concerned about that. Her blood pressures at home is been under great control. Denies headaches or dizziness. Denies nosebleeds. Denies chest pains or shortness of breath. Denies change in bowel or bladder habits. Past Medical History:   Diagnosis Date    Anxiety     Anxiety state, unspecified 5/3/2010    Atypical chest pain     Cancer ([address] 1998    endometrial cancer and breast    DJD (degenerative joint disease)     GERD (gastroesophageal reflux disease)     Hypertension     Nausea & vomiting     OA (osteoarthritis) 5/3/2010    Osteoarthritis of right hip 9/20/2012    PUD (peptic ulcer disease)     BLEEDING AFTER THR    Stenosis, spinal, lumbar        Past Surgical History:   Procedure Laterality Date    BREAST SURGERY PROCEDURE UNLISTED      lumpectomy 05/22/14    HX BLADDER SUSPENSION      HX CATARACT REMOVAL Bilateral     HX DILATION AND CURETTAGE  1998    HX GYN  2003    PELVIC FLOOR RECONSTRUCTION    HX HIP REPLACEMENT  2013    Right    HX ORTHOPAEDIC Right 2012    THR    HX OTHER SURGICAL  1999    A&P REPAIR    HX SHOULDER REPLACEMENT  2/2010    Left shoulder    HX MAXIMO AND BSO  1998    HX WISDOM TEETH EXTRACTION      VASCULAR SURGERY PROCEDURE UNLIST  1966    vein stripping left leg       Prior to Admission medications    Medication Sig Start Date End Date Taking?  Authorizing Provider   PARoxetine (PAXIL) 20 mg tablet TAKE 1 TABLET DAILY 12/19/18  Yes Lilian Dash MD   diclofenac-miSOPROStol (ARTHROTEC 50)  mg-mcg per tablet Take 1 Tab by mouth two (2) times a day. 12/19/18  Yes Germán Hernandez MD   omeprazole (PRILOSEC) 20 mg capsule TAKE 1 CAPSULE DAILY 9/23/18  Yes Germán Hernandez MD   valsartan (DIOVAN) 80 mg tablet TAKE 1 TABLET DAILY 12/8/17  Yes Vane Loredogm III, MD   letrozole [facility]) 2.5 mg tablet Take 1 Tab by mouth daily. 10/20/16  Yes Germán Hernandez MD   zoledronic acid (RECLAST) 5 mg/100 mL soln 5 mg by IntraVENous route Once a year. Yes Provider, Historical   cholecalciferol, vitamin D3, (VITAMIN D3) 2,000 unit tab Take 4,000 mcg by mouth daily. Yes Provider, Historical   INULIN (FIBER GUMMIES PO) Take  by mouth. 2 gummies daily   Yes Provider, Historical   cyanocobalamin (VITAMIN B-12) 1,000 mcg sublingual tablet Take 2,000 mcg by mouth daily. Yes Provider, Historical   multivitamin (ONE A DAY) tablet Take 2 Tabs by mouth daily. Yes Provider, Historical   Calcium-Cholecalciferol, D3, (CALCIUM 500 + D) 500 mg(1,250mg) -400 unit tab Take 1 Tab by mouth two (2) times a day. 9/18/15   Vane Maritn III, MD   docusate sodium (COLACE) 100 mg capsule Take 300 mg by mouth daily. Provider, Historical       Social History     Socioeconomic History    Marital status:      Spouse name: Not on file    Number of children: Not on file    Years of education: Not on file    Highest education level: Not on file   Social Needs    Financial resource strain: Not on file    Food insecurity - worry: Not on file    Food insecurity - inability: Not on file    Transportation needs - medical: Not on file   [employer] needs - non-medical: Not on file   Occupational History    Not on file   Tobacco Use    Smoking status: Never Smoker    Smokeless tobacco: Never Used   Substance and Sexual Activity    Alcohol use:  Yes     Alcohol/week: 0.5 - 1.0 oz     Types: 1 - 2 Glasses of wine per week     Comment: \"VERY LITTLE\"    Drug use: No    Sexual activity: Yes     Partners: Male   Other Topics Concern    Not on file   Social History Narrative    Not on file          ROS  Per HPI    Visit Vitals  /69   Pulse 98   Resp 16   Ht 5' 6\" (1.676 m)   Wt 172 lb (78 kg)   SpO2 96%   BMI 27.76 kg/m²         Physical Exam   Physical Examination: General appearance - alert, well appearing, and in no distress  Eyes - pupils equal and reactive, extraocular eye movements intact  Ears - bilateral TM's and external ear canals normal  Nose - normal and patent, no erythema, discharge or polyps  Mouth - mucous membranes moist, pharynx normal without lesions  Neck - supple, no significant adenopathy  Lymphatics - no palpable lymphadenopathy, no hepatosplenomegaly  Chest - clear to auscultation, no wheezes, rales or rhonchi, symmetric air entry  Heart - normal rate, regular rhythm, normal S1, S2, no murmurs, rubs, clicks or gallops  Abdomen - soft, nontender, nondistended, no masses or organomegaly  Neurological - alert, oriented, normal speech, no focal findings or movement disorder noted  Musculoskeletal - no joint tenderness, deformity or swelling  Extremities - peripheral pulses normal, no pedal edema, no clubbing or cyanosis      Assessment/Plan:  Diagnoses and all orders for this visit:    1. Medicare annual wellness visit, subsequent    2. Screening for alcoholism    3. Advance care planning    4. Screening for depression    5. Essential hypertension -? Tightly controlled. Will check blood pressure readings at home and let me know the readings. -     CBC WITH AUTOMATED DIFF  -     LIPID PANEL  -     METABOLIC PANEL, COMPREHENSIVE  -     TSH RFX ON ABNORMAL TO FREE T4  -     UA/M W/RFLX CULTURE, ROUTINE    6. Hyperglycemia -diet controlled. Will check labs and consider medication for that. -     HEMOGLOBIN A1C WITH EAG    7. Endometrial cancer ([address] -stable and seeing GYN regularly. 8. 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   Seeing oncology and breast surgeon regularly. 9. Primary osteoarthritis involving multiple joints    10. Memory loss -likely stress related. Will check labs for that and consider evaluation if persistent. -     VITAMIN B12    Other orders  -     diclofenac-miSOPROStol (ARTHROTEC 50)  mg-mcg per tablet; Take 1 Tab by mouth two (2) times a day. Follow-up Disposition:  Return in about 1 year (around 12/19/2019) for CPE. Advised her to call back or return to office if symptoms worsen/change/persist.  Discussed expected course/resolution/complications of diagnosis in detail with patient. Medication risks/benefits/costs/interactions/alternatives discussed with patient. She was given an after visit summary which includes diagnoses, current medications, & vitals. She expressed understanding with the diagnosis and plan.

## 2019-06-18 ENCOUNTER — TELEPHONE (OUTPATIENT)
Dept: INTERNAL MEDICINE CLINIC | Age: 84
End: 2019-06-18

## 2019-06-18 RX ORDER — PAROXETINE HYDROCHLORIDE 20 MG/1
TABLET, FILM COATED ORAL
Qty: 14 TAB | Refills: 0 | Status: SHIPPED | OUTPATIENT
Start: 2019-06-18 | End: 2020-03-30

## 2019-06-18 NOTE — TELEPHONE ENCOUNTER
On-call: Patient called stating her mail order pharmacy has not sent her refill on her Paxil 20 mg daily and is asking for a 2-week supply to be sent to her local pharmacy while she is waiting on her mail order. Medication sent.

## 2020-03-30 RX ORDER — PAROXETINE HYDROCHLORIDE 20 MG/1
TABLET, FILM COATED ORAL
Qty: 90 TAB | Refills: 3 | Status: SHIPPED | OUTPATIENT
Start: 2020-03-30 | End: 2020-08-14 | Stop reason: DRUGHIGH

## 2020-04-01 PROBLEM — Z85.3 HISTORY OF BREAST CANCER: Status: ACTIVE | Noted: 2020-04-01

## 2020-04-01 PROBLEM — Z85.42 HISTORY OF ENDOMETRIAL CANCER: Status: ACTIVE | Noted: 2020-04-01

## 2020-07-22 ENCOUNTER — TELEPHONE (OUTPATIENT)
Dept: INTERNAL MEDICINE CLINIC | Age: 85
End: 2020-07-22

## 2020-07-24 ENCOUNTER — OFFICE VISIT (OUTPATIENT)
Dept: INTERNAL MEDICINE CLINIC | Age: 85
End: 2020-07-24

## 2020-07-24 VITALS
RESPIRATION RATE: 16 BRPM | HEART RATE: 78 BPM | DIASTOLIC BLOOD PRESSURE: 88 MMHG | OXYGEN SATURATION: 99 % | HEIGHT: 66 IN | WEIGHT: 157 LBS | TEMPERATURE: 97.8 F | SYSTOLIC BLOOD PRESSURE: 153 MMHG | BODY MASS INDEX: 25.23 KG/M2

## 2020-07-24 DIAGNOSIS — F41.1 ANXIETY STATE: ICD-10-CM

## 2020-07-24 DIAGNOSIS — R63.4 WEIGHT LOSS: Primary | ICD-10-CM

## 2020-07-24 DIAGNOSIS — M15.9 PRIMARY OSTEOARTHRITIS INVOLVING MULTIPLE JOINTS: ICD-10-CM

## 2020-07-24 DIAGNOSIS — I10 ESSENTIAL HYPERTENSION: ICD-10-CM

## 2020-07-24 LAB
BILIRUB UR QL STRIP: NEGATIVE
GLUCOSE UR-MCNC: NEGATIVE MG/DL
KETONES P FAST UR STRIP-MCNC: NEGATIVE MG/DL
PH UR STRIP: 6 [PH] (ref 4.6–8)
PROT UR QL STRIP: NEGATIVE
SP GR UR STRIP: 1.01 (ref 1–1.03)
UA UROBILINOGEN AMB POC: NORMAL (ref 0.2–1)
URINALYSIS CLARITY POC: CLEAR
URINALYSIS COLOR POC: YELLOW
URINE BLOOD POC: NORMAL
URINE LEUKOCYTES POC: NEGATIVE
URINE NITRITES POC: NEGATIVE

## 2020-07-25 LAB — CREATININE, EXTERNAL: 0.66

## 2020-07-25 NOTE — PROGRESS NOTES
HPI:  Felix Biggs is a 81y.o. year old female who is here for a follow-up visit. She is recently been under great deal of stress with taking care of her . She is lost more than 15 pounds over the last 4 months or so. Her appetite is been decreased. She is not been eating well. She denies any headaches. No dizziness. No fevers or chills. No chest pains or shortness of breath. No vomiting. Bowel movements and bladder function have been good. She does feel increased anxiety and stress and has a hard time dealing with caring for her . Past Medical History:   Diagnosis Date    Anxiety     Anxiety state, unspecified 5/3/2010    Atypical chest pain     Breast cancer metastasized to axillary lymph node ([facility] 9/3/2014    Cancer ([facility] [address] 1998    endometrial cancer and breast    DJD (degenerative joint disease)     Endometrial cancer ([facility] 3/20/2014    GERD (gastroesophageal reflux disease)     Hypertension     Nausea & vomiting     OA (osteoarthritis) 5/3/2010    Osteoarthritis of right hip 9/20/2012    PUD (peptic ulcer disease)     BLEEDING AFTER THR    Stenosis, spinal, lumbar        Past Surgical History:   Procedure Laterality Date    BREAST SURGERY PROCEDURE UNLISTED      lumpectomy 05/22/14    HX BLADDER SUSPENSION      HX CATARACT REMOVAL Bilateral     HX DILATION AND CURETTAGE  1998    HX GYN  2003    PELVIC FLOOR RECONSTRUCTION    HX HIP REPLACEMENT  2013    Right    HX ORTHOPAEDIC Right 2012    THR    HX OTHER SURGICAL  1999    A&P REPAIR    HX SHOULDER REPLACEMENT  2/2010    Left shoulder    HX MAXIMO AND BSO  1998    HX WISDOM TEETH EXTRACTION      VASCULAR SURGERY PROCEDURE UNLIST  1966    vein stripping left leg       Prior to Admission medications    Medication Sig Start Date End Date Taking?  Authorizing Provider   PARoxetine (PAXIL) 20 mg tablet TAKE 1 TABLET DAILY 3/30/20  Yes Meri Kingsley III, MD   omeprazole (PRILOSEC) 20 mg capsule TAKE 1 CAPSULE DAILY 9/23/18  Yes Claudy Finnegan MD   letrozole [facility]) 2.5 mg tablet Take 1 Tab by mouth daily. 10/20/16  Yes Claudy Finnegan MD   docusate sodium (COLACE) 100 mg capsule Take 300 mg by mouth daily. Yes Provider, Historical   INULIN (FIBER GUMMIES PO) Take  by mouth. 2 gummies daily   Yes Provider, Historical   multivitamin (ONE A DAY) tablet Take 2 Tabs by mouth daily. Yes Provider, Historical   diclofenac-miSOPROStol (ARTHROTEC 50)  mg-mcg per tablet Take 1 Tab by mouth two (2) times a day. 12/19/18   Claudy Finnegan MD   valsartan (DIOVAN) 80 mg tablet TAKE 1 TABLET DAILY 12/8/17   Meri Kingsley III, MD   zoledronic acid (RECLAST) 5 mg/100 mL soln 5 mg by IntraVENous route Once a year. Provider, Historical   cholecalciferol, vitamin D3, (VITAMIN D3) 2,000 unit tab Take 4,000 mcg by mouth daily. Provider, Historical   Calcium-Cholecalciferol, D3, (CALCIUM 500 + D) 500 mg(1,250mg) -400 unit tab Take 1 Tab by mouth two (2) times a day. 9/18/15   Meri Kingsley III, MD   cyanocobalamin (VITAMIN B-12) 1,000 mcg sublingual tablet Take 2,000 mcg by mouth daily. Provider, Historical       Social History     Socioeconomic History    Marital status:      Spouse name: Not on file    Number of children: Not on file    Years of education: Not on file    Highest education level: Not on file   Occupational History    Not on file   Social Needs    Financial resource strain: Not on file    Food insecurity     Worry: Not on file     Inability: Not on file    Transportation needs     Medical: Not on file     Non-medical: Not on file   Tobacco Use    Smoking status: Never Smoker    Smokeless tobacco: Never Used   Substance and Sexual Activity    Alcohol use:  Yes     Alcohol/week: 0.8 - 1.7 standard drinks     Types: 1 - 2 Glasses of wine per week     Comment: \"VERY LITTLE\"    Drug use: No    Sexual activity: Yes     Partners: Male   Lifestyle    Physical activity     Days per week: Not on file     Minutes per session: Not on file    Stress: Not on file   Relationships    Social connections     Talks on phone: Not on file     Gets together: Not on file     Attends [employer] service: Not on file     Active member of club or organization: Not on file     Attends meetings of clubs or organizations: Not on file     Relationship status: Not on file    Intimate partner violence     Fear of current or ex partner: Not on file     Emotionally abused: Not on file     Physically abused: Not on file     Forced sexual activity: Not on file   Other Topics Concern    Not on file   Social History Narrative    Not on file          ROS  Per HPI    Visit Vitals  /88   Pulse 78   Temp 97.8 °F (36.6 °C) (Temporal)   Resp 16   Ht 5' 6\" (1.676 m)   Wt 157 lb (71.2 kg)   SpO2 99%   BMI 25.34 kg/m²         Physical Exam   Physical Examination: General appearance - alert, well appearing, and in no distress  Ears - bilateral TM's and external ear canals normal  Mouth - mucous membranes moist, pharynx normal without lesions  Neck - supple, no significant adenopathy  Lymphatics - no palpable lymphadenopathy, no hepatosplenomegaly  Chest - clear to auscultation, no wheezes, rales or rhonchi, symmetric air entry  Heart - normal rate, regular rhythm, normal S1, S2, no murmurs, rubs, clicks or gallops  Abdomen - soft, nontender, nondistended, no masses or organomegaly  Neurological - alert, oriented, normal speech, no focal findings or movement disorder noted  Musculoskeletal - no joint tenderness, deformity or swelling  Extremities - peripheral pulses normal, no pedal edema, no clubbing or cyanosis      Assessment/Plan:  Diagnoses and all orders for this visit:    1. Weight loss  -? Stress related. At this point I feel it is appropriate to adjust her medications and see if that will help. Will check labs to be sure there is no metabolic cause.   If labs are abnormal, consider further evaluation of those.  -     AMB POC URINALYSIS DIP STICK AUTO W/O MICRO  -     METABOLIC PANEL, COMPREHENSIVE  -     CBC WITH AUTOMATED DIFF  -     TSH AND FREE T4  -     VITAMIN B12    2. Essential hypertension -blood pressures well controlled usually. We will continue to monitor as anxiety improves. -     METABOLIC PANEL, COMPREHENSIVE  -     CBC WITH AUTOMATED DIFF  -     TSH AND FREE T4  -     VITAMIN B12    3. Anxiety state -increase Paxil to 40 mg a day. Continue to assess the help that her  needs at home. 4. Primary osteoarthritis involving multiple joints -continue meds if needed. Follow-up and Dispositions    · Return in about 1 month (around 8/24/2020). Advised her to call back or return to office if symptoms worsen/change/persist.  Discussed expected course/resolution/complications of diagnosis in detail with patient. Medication risks/benefits/costs/interactions/alternatives discussed with patient. She was given an after visit summary which includes diagnoses, current medications, & vitals. She expressed understanding with the diagnosis and plan.

## 2020-08-14 ENCOUNTER — OFFICE VISIT (OUTPATIENT)
Dept: INTERNAL MEDICINE CLINIC | Age: 85
End: 2020-08-14
Payer: MEDICARE

## 2020-08-14 VITALS
HEIGHT: 66 IN | RESPIRATION RATE: 16 BRPM | BODY MASS INDEX: 25.23 KG/M2 | DIASTOLIC BLOOD PRESSURE: 74 MMHG | HEART RATE: 79 BPM | OXYGEN SATURATION: 99 % | WEIGHT: 157 LBS | SYSTOLIC BLOOD PRESSURE: 116 MMHG | TEMPERATURE: 96.7 F

## 2020-08-14 DIAGNOSIS — R73.9 HYPERGLYCEMIA: ICD-10-CM

## 2020-08-14 DIAGNOSIS — Z85.42 HISTORY OF ENDOMETRIAL CANCER: ICD-10-CM

## 2020-08-14 DIAGNOSIS — M81.0 AGE RELATED OSTEOPOROSIS, UNSPECIFIED PATHOLOGICAL FRACTURE PRESENCE: ICD-10-CM

## 2020-08-14 DIAGNOSIS — C50.911 MALIGNANT NEOPLASM OF RIGHT FEMALE BREAST, UNSPECIFIED ESTROGEN RECEPTOR STATUS, UNSPECIFIED SITE OF BREAST (HCC): ICD-10-CM

## 2020-08-14 DIAGNOSIS — Z00.00 MEDICARE ANNUAL WELLNESS VISIT, SUBSEQUENT: Primary | ICD-10-CM

## 2020-08-14 DIAGNOSIS — I10 ESSENTIAL HYPERTENSION: ICD-10-CM

## 2020-08-14 DIAGNOSIS — F41.1 ANXIETY STATE: ICD-10-CM

## 2020-08-14 DIAGNOSIS — Z13.5 GLAUCOMA SCREENING: ICD-10-CM

## 2020-08-14 PROCEDURE — G8752 SYS BP LESS 140: HCPCS | Performed by: INTERNAL MEDICINE

## 2020-08-14 PROCEDURE — G0439 PPPS, SUBSEQ VISIT: HCPCS | Performed by: INTERNAL MEDICINE

## 2020-08-14 PROCEDURE — 99214 OFFICE O/P EST MOD 30 MIN: CPT | Performed by: INTERNAL MEDICINE

## 2020-08-14 PROCEDURE — G8432 DEP SCR NOT DOC, RNG: HCPCS | Performed by: INTERNAL MEDICINE

## 2020-08-14 PROCEDURE — 3288F FALL RISK ASSESSMENT DOCD: CPT | Performed by: INTERNAL MEDICINE

## 2020-08-14 PROCEDURE — G8427 DOCREV CUR MEDS BY ELIG CLIN: HCPCS | Performed by: INTERNAL MEDICINE

## 2020-08-14 PROCEDURE — 1100F PTFALLS ASSESS-DOCD GE2>/YR: CPT | Performed by: INTERNAL MEDICINE

## 2020-08-14 PROCEDURE — G8536 NO DOC ELDER MAL SCRN: HCPCS | Performed by: INTERNAL MEDICINE

## 2020-08-14 PROCEDURE — G8419 CALC BMI OUT NRM PARAM NOF/U: HCPCS | Performed by: INTERNAL MEDICINE

## 2020-08-14 PROCEDURE — 1090F PRES/ABSN URINE INCON ASSESS: CPT | Performed by: INTERNAL MEDICINE

## 2020-08-14 PROCEDURE — G8754 DIAS BP LESS 90: HCPCS | Performed by: INTERNAL MEDICINE

## 2020-08-14 RX ORDER — PAROXETINE HYDROCHLORIDE 40 MG/1
40 TABLET, FILM COATED ORAL DAILY
Qty: 90 TAB | Refills: 2 | Status: SHIPPED | OUTPATIENT
Start: 2020-08-14 | End: 2020-09-11

## 2020-08-14 NOTE — PATIENT INSTRUCTIONS
Preventing Falls: Care Instructions  Your Care Instructions     Getting around your home safely can be a challenge if you have injuries or health problems that make it easy for you to fall. Loose rugs and furniture in walkways are among the dangers for many older people who have problems walking or who have poor eyesight. People who have conditions such as arthritis, osteoporosis, or dementia also have to be careful not to fall. You can make your home safer with a few simple measures. Follow-up care is a key part of your treatment and safety. Be sure to make and go to all appointments, and call your doctor if you are having problems. It's also a good idea to know your test results and keep a list of the medicines you take. How can you care for yourself at home? Taking care of yourself  · You may get dizzy if you do not drink enough water. To prevent dehydration, drink plenty of fluids, enough so that your urine is light yellow or clear like water. Choose water and other caffeine-free clear liquids. If you have kidney, heart, or liver disease and have to limit fluids, talk with your doctor before you increase the amount of fluids you drink. · Exercise regularly to improve your strength, muscle tone, and balance. Walk if you can. Swimming may be a good choice if you cannot walk easily. · Have your vision and hearing checked each year or any time you notice a change. If you have trouble seeing and hearing, you might not be able to avoid objects and could lose your balance. · Know the side effects of the medicines you take. Ask your doctor or pharmacist whether the medicines you take can affect your balance. Sleeping pills or sedatives can affect your balance. · Limit the amount of alcohol you drink. Alcohol can impair your balance and other senses. · Ask your doctor whether calluses or corns on your feet need to be removed.  If you wear loose-fitting shoes because of calluses or corns, you can lose your balance and fall. · Talk to your doctor if you have numbness in your feet. Preventing falls at home  · Remove raised doorway thresholds, throw rugs, and clutter. Repair loose carpet or raised areas in the floor. · Move furniture and electrical cords to keep them out of walking paths. · Use nonskid floor wax, and wipe up spills right away, especially on ceramic tile floors. · If you use a walker or cane, put rubber tips on it. If you use crutches, clean the bottoms of them regularly with an abrasive pad, such as steel wool. · Keep your house well lit, especially [place] Dally, and outside walkways. Use night-lights in areas such as hallways and bathrooms. Add extra light switches or use remote switches (such as switches that go on or off when you clap your hands) to make it easier to turn lights on if you have to get up during the night. · Install sturdy handrails on stairways. · Move items in your cabinets so that the things you use a lot are on the lower shelves (about waist level). · Keep a cordless phone and a flashlight with new batteries by your bed. If possible, put a phone in each of the main rooms of your house, or carry a cell phone in case you fall and cannot reach a phone. Or, you can wear a device around your neck or wrist. You push a button that sends a signal for help. · Wear low-heeled shoes that fit well and give your feet good support. Use footwear with nonskid soles. Check the heels and soles of your shoes for wear. Repair or replace worn heels or soles. · Do not wear socks without shoes on wood floors. · Walk on the grass when the sidewalks are slippery. If you live in an area that gets snow and ice in the winter, sprinkle salt on slippery steps and sidewalks. Preventing falls in the bath  · Install grab bars and nonskid mats inside and outside your shower or tub and near the toilet and sinks. · Use shower chairs and bath benches.   · Use a hand-held shower head that will allow you to sit while showering. · Get into a tub or shower by putting the weaker leg in first. Get out of a tub or shower with your strong side first.  · Repair loose toilet seats and consider installing a raised toilet seat to make getting on and off the toilet easier. · Keep your bathroom door unlocked while you are in the shower. Where can you learn more? Go to http://daniele-elmer.info/  Enter G117 in the search box to learn more about \"Preventing Falls: Care Instructions. \"  Current as of: August 7, 2019               Content Version: 12.5  © 8131-4823 [employer]. Care instructions adapted under license by [employer] (which disclaims liability or warranty for this information). If you have questions about a medical condition or this instruction, always ask your healthcare professional. [address] any warranty or liability for your use of this information. Medicare Wellness Visit, Female     The best way to live healthy is to have a lifestyle where you eat a well-balanced diet, exercise regularly, limit alcohol use, and quit all forms of tobacco/nicotine, if applicable. Regular preventive services are another way to keep healthy. Preventive services (vaccines, screening tests, monitoring & exams) can help personalize your care plan, which helps you manage your own care. Screening tests can find health problems at the earliest stages, when they are easiest to treat. [facility] follows the current, evidence-based guidelines published by the [place] (USPSTF) when recommending preventive services for our patients. Because we follow these guidelines, sometimes recommendations change over time as research supports it. (For example, mammograms used to be recommended annually.  Even though Medicare will still pay for an annual mammogram, the newer guidelines recommend a mammogram every two years for women of average risk). Of course, you and your doctor may decide to screen more often for some diseases, based on your risk and your co-morbidities (chronic disease you are already diagnosed with). Preventive services for you include:  - Medicare offers their members a free annual wellness visit, which is time for you and your primary care provider to discuss and plan for your preventive service needs. Take advantage of this benefit every year!  -All adults over the age of 72 should receive the recommended pneumonia vaccines. Current USPSTF guidelines recommend a series of two vaccines for the best pneumonia protection.   -All adults should have a flu vaccine yearly and a tetanus vaccine every 10 years.   -All adults age 48 and older should receive the shingles vaccines (series of two vaccines). -All adults age 38-68 who are overweight should have a diabetes screening test once every three years.   -All adults born between [birth year] and 1965 should be screened once for Hepatitis C.  -Other screening tests and preventive services for persons with diabetes include: an eye exam to screen for diabetic retinopathy, a kidney function test, a foot exam, and stricter control over your cholesterol.   -Cardiovascular screening for adults with routine risk involves an electrocardiogram (ECG) at intervals determined by your doctor.   -Colorectal cancer screenings should be done for adults age 54-65 with no increased risk factors for colorectal cancer. There are a number of acceptable methods of screening for this type of cancer. Each test has its own benefits and drawbacks. Discuss with your doctor what is most appropriate for you during your annual wellness visit. The different tests include: colonoscopy (considered the best screening method), a fecal occult blood test, a fecal DNA test, and sigmoidoscopy.    -A bone mass density test is recommended when a woman turns 65 to screen for osteoporosis. This test is only recommended one time, as a screening. Some providers will use this same test as a disease monitoring tool if you already have osteoporosis. -Breast cancer screenings are recommended every other year for women of normal risk, age 54-69.  -Cervical cancer screenings for women over age 72 are only recommended with certain risk factors.      Here is a list of your current Health Maintenance items (your personalized list of preventive services) with a due date:  Health Maintenance Due   Topic Date Due    DTaP/Tdap/Td  (1 - Tdap) 09/12/1956    Shingles Vaccine (1 of 2) 09/12/1986    Pneumococcal Vaccine (1 of 1 - PPSV23) 01/01/2012    Annual Well Visit  12/20/2019    Glaucoma Screening   05/16/2020    Flu Vaccine  08/01/2020

## 2020-08-14 NOTE — PROGRESS NOTES
This is the Subsequent Medicare Annual Wellness Exam, performed 12 months or more after the Initial AWV or the last Subsequent AWV    I have reviewed the patient's medical history in detail and updated the computerized patient record. As well as a follow-up of her health issues. She was seen here recently and had lab work done. It was not available to me initially but when researched it was found and all normal.  We discussed her anxiety recently and I attempted to increase her Paxil to 40 mg a day. She has not made that increase as she was concerned about becoming hooked on it. She denies any headaches or dizziness. No exertional chest pain or shortness of breath. She has had some right breast pain. She is due for Reclast and has not gotten that done.   She is also due for a mammogram.    History     Patient Active Problem List   Diagnosis Code    Essential hypertension I10    Anxiety state F41.1    OA (osteoarthritis) M19.90    Hyperglycemia R73.9    Osteoarthritis of right hip M16.11    Vitamin D deficiency E55.9    Advance directive declined by patient Z68.5    History of endometrial cancer Z85.42    History of breast cancer Z85.3     Past Medical History:   Diagnosis Date    Anxiety     Anxiety state, unspecified 5/3/2010    Atypical chest pain     Breast cancer metastasized to axillary lymph node ([address] 9/3/2014    Cancer ([address] 1998    endometrial cancer and breast    DJD (degenerative joint disease)     Endometrial cancer ([address] 3/20/2014    GERD (gastroesophageal reflux disease)     Hypertension     Nausea & vomiting     OA (osteoarthritis) 5/3/2010    Osteoarthritis of right hip 9/20/2012    PUD (peptic ulcer disease)     BLEEDING AFTER THR    Stenosis, spinal, lumbar       Past Surgical History:   Procedure Laterality Date    BREAST SURGERY PROCEDURE UNLISTED      lumpectomy 05/22/14    HX BLADDER SUSPENSION      HX CATARACT REMOVAL Bilateral     HX DILATION AND CURETTAGE  1998    HX GYN  2003    PELVIC FLOOR RECONSTRUCTION    HX HIP REPLACEMENT  2013    Right    HX ORTHOPAEDIC Right 2012    THR    HX OTHER SURGICAL  1999    A&P REPAIR    HX SHOULDER REPLACEMENT  2/2010    Left shoulder    HX MAXIMO AND BSO  1998    HX WISDOM TEETH EXTRACTION      VASCULAR SURGERY PROCEDURE UNLIST  1966    vein stripping left leg     Current Outpatient Medications   Medication Sig Dispense Refill    PARoxetine (PAXIL) 20 mg tablet TAKE 1 TABLET DAILY 90 Tab 3    diclofenac-miSOPROStol (ARTHROTEC 50)  mg-mcg per tablet Take 1 Tab by mouth two (2) times a day. (Patient taking differently: Take 1 Tab by mouth two (2) times daily as needed.) 60 Tab 3    omeprazole (PRILOSEC) 20 mg capsule TAKE 1 CAPSULE DAILY (Patient taking differently: as needed.) 90 Cap 3    letrozole (FEMARA) 2.5 mg tablet Take 1 Tab by mouth daily. 90 Tab 3    zoledronic acid (RECLAST) 5 mg/100 mL soln 5 mg by IntraVENous route Once a year.  cholecalciferol, vitamin D3, (VITAMIN D3) 2,000 unit tab Take 4,000 mcg by mouth daily.  docusate sodium (COLACE) 100 mg capsule Take 300 mg by mouth daily.  INULIN (FIBER GUMMIES PO) Take  by mouth. 2 gummies daily      cyanocobalamin (VITAMIN B-12) 1,000 mcg sublingual tablet Take 2,000 mcg by mouth daily.  multivitamin (ONE A DAY) tablet Take 2 Tabs by mouth daily.       valsartan (DIOVAN) 80 mg tablet TAKE 1 TABLET DAILY 90 Tab 3     Allergies   Allergen Reactions    Oxycontin [Oxycodone] Hives    Celebrex [Celecoxib] Hives    Ciprofloxacin Rash    E-Mycin [Erythromycin] Rash    Keflex [Cephalexin] Rash    Morphine Rash    Pcn [Penicillins] Rash    Tetracycline Rash       Family History   Problem Relation Age of Onset    Stroke Mother     Lung Disease Mother     Heart Disease Father     Cancer Other         breast    Post-op Nausea/Vomiting Other     Anesth Problems Other         PONV    Other Grandchild         GENETIC DJD    No Known Problems Daughter      Social History     Tobacco Use    Smoking status: Never Smoker    Smokeless tobacco: Never Used   Substance Use Topics    Alcohol use: Yes     Alcohol/week: 0.8 - 1.7 standard drinks     Types: 1 - 2 Glasses of wine per week     Comment: \"VERY LITTLE\"   ROS - Per HPI  Physical Examination: General appearance - alert, well appearing, and in no distress  Ears - bilateral TM's and external ear canals normal  Nose - normal and patent, no erythema, discharge or polyps  Mouth - mucous membranes moist, pharynx normal without lesions  Neck - supple, no significant adenopathy  Lymphatics - no palpable lymphadenopathy, no hepatosplenomegaly  Chest - clear to auscultation, no wheezes, rales or rhonchi, symmetric air entry  Heart - normal rate and regular rhythm  Abdomen - soft, nontender, nondistended, no masses or organomegaly  Back exam - full range of motion, no tenderness, palpable spasm or pain on motion  Neurological - alert, oriented, normal speech, no focal findings or movement disorder noted  Musculoskeletal - no joint tenderness, deformity or swelling  Extremities - peripheral pulses normal, no pedal edema, no clubbing or cyanosis      Depression Risk Factor Screening:     3 most recent PHQ Screens 12/19/2018   Little interest or pleasure in doing things Not at all   Feeling down, depressed, irritable, or hopeless Not at all   Total Score PHQ 2 0       Alcohol Risk Factor Screening:   Do you average 1 drink per night or more than 7 drinks a week:  No    On any one occasion in the past three months have you have had more than 3 drinks containing alcohol:  No      Functional Ability and Level of Safety:   Hearing: The patient wears hearing aids. Activities of Daily Living: The home contains: no safety equipment. Patient does total self care     Ambulation: with no difficulty     Fall Risk:  Fall Risk Assessment, last 12 mths 8/14/2020   Able to walk? Yes   Fall in past 12 months? Yes   Fall with injury? No   Number of falls in past 12 months 2   Fall Risk Score 2     Abuse Screen:  Patient is not abused       Cognitive Screening   Has your family/caregiver stated any concerns about your memory: no         Patient Care Team   Patient Care Team:  Brandie Pierre MD as PCP - General  Wes Slaughter MD (Ophthalmology)  Dona Pugh MD as Physician (Hematology and Oncology)  Reva Luke MD as Surgeon (General Surgery)  Cristobal Ambrocio MD as Physician (Dermatology)  Jerica Flores MD as Surgeon (Gastroenterology)  Nely Carbone MD as Physician (Orthopedic Surgery)  Cem Orozco MD (Endocrinology)    Assessment/Plan   Education and counseling provided:  Are appropriate based on today's review and evaluation  End-of-Life planning (with patient's consent)  Influenza Vaccine  Screening Mammography  Bone mass measurement (DEXA)    Diagnoses and all orders for this visit:    1. Medicare annual wellness visit, subsequent    2. Glaucoma screening  -     REFERRAL TO OPHTHALMOLOGY    3. Malignant neoplasm of right female breast, unspecified estrogen receptor status, unspecified site of breast ([facility] [address] -needs repeat mammogram and will likely see surgery and oncology in follow-up. -     [facility] 3D TIMOTHY W MAMMO BI DX INCL CAD; Future    4. Age related osteoporosis, unspecified pathological fracture presence -repeat bone density and then arrange Reclast.  -     DEXA BONE DENSITY STUDY AXIAL; Future    5. Hyperglycemia -sugar well controlled on recent labs. 6. History of endometrial cancer -stable. 7. Essential hypertension -stable. 8. Anxiety state -encouraged her to increase her Paxil to 40 mg a day and see if that is helpful. Her daughter will let me know how it is going in 2 weeks. Other orders  -     PARoxetine (PAXIL) 40 mg tablet; Take 1 Tab by mouth daily.         Health Maintenance Due   Topic Date Due    DTaP/Tdap/Td series (1 - Tdap) 09/12/1956    Shingrix Vaccine Age 50> (1 of 2) 09/12/1986    Pneumococcal 65+ years (1 of 1 - PPSV23) 01/01/2012    Medicare Yearly Exam  12/20/2019    GLAUCOMA SCREENING Q2Y  05/16/2020    Influenza Age 5 to Adult  08/01/2020

## 2020-08-19 ENCOUNTER — TELEPHONE (OUTPATIENT)
Dept: INTERNAL MEDICINE CLINIC | Age: 85
End: 2020-08-19

## 2020-08-19 NOTE — TELEPHONE ENCOUNTER
Fax: Jasvir Abraham's daughter says she called to schedule a diagnostic mammogram and was told they would need an order from the doctor. She asking for this to be done please.

## 2020-08-31 ENCOUNTER — HOSPITAL ENCOUNTER (OUTPATIENT)
Dept: BONE DENSITY | Age: 85
Discharge: HOME | End: 2020-08-31
Attending: INTERNAL MEDICINE
Payer: MEDICARE

## 2020-08-31 DIAGNOSIS — M81.0 AGE RELATED OSTEOPOROSIS, UNSPECIFIED PATHOLOGICAL FRACTURE PRESENCE: ICD-10-CM

## 2020-08-31 PROCEDURE — 77080 DXA BONE DENSITY AXIAL: CPT

## 2020-09-08 NOTE — PROGRESS NOTES
Spoke with patient and advised of results - she is afraid of side effects of Prolia and request apt to discuss.   Scheduled for Friday 9/11/2020 @ 1 PM.

## 2020-09-11 ENCOUNTER — OFFICE VISIT (OUTPATIENT)
Dept: INTERNAL MEDICINE CLINIC | Age: 85
End: 2020-09-11
Payer: MEDICARE

## 2020-09-11 VITALS
BODY MASS INDEX: 25.46 KG/M2 | SYSTOLIC BLOOD PRESSURE: 146 MMHG | WEIGHT: 158.4 LBS | DIASTOLIC BLOOD PRESSURE: 87 MMHG | HEART RATE: 77 BPM | OXYGEN SATURATION: 99 % | HEIGHT: 66 IN

## 2020-09-11 DIAGNOSIS — F41.1 ANXIETY STATE: Primary | ICD-10-CM

## 2020-09-11 DIAGNOSIS — M81.0 AGE-RELATED OSTEOPOROSIS WITHOUT CURRENT PATHOLOGICAL FRACTURE: ICD-10-CM

## 2020-09-11 PROCEDURE — G8419 CALC BMI OUT NRM PARAM NOF/U: HCPCS | Performed by: INTERNAL MEDICINE

## 2020-09-11 PROCEDURE — 1100F PTFALLS ASSESS-DOCD GE2>/YR: CPT | Performed by: INTERNAL MEDICINE

## 2020-09-11 PROCEDURE — 3288F FALL RISK ASSESSMENT DOCD: CPT | Performed by: INTERNAL MEDICINE

## 2020-09-11 PROCEDURE — 1090F PRES/ABSN URINE INCON ASSESS: CPT | Performed by: INTERNAL MEDICINE

## 2020-09-11 PROCEDURE — G8536 NO DOC ELDER MAL SCRN: HCPCS | Performed by: INTERNAL MEDICINE

## 2020-09-11 PROCEDURE — G8753 SYS BP > OR = 140: HCPCS | Performed by: INTERNAL MEDICINE

## 2020-09-11 PROCEDURE — G8432 DEP SCR NOT DOC, RNG: HCPCS | Performed by: INTERNAL MEDICINE

## 2020-09-11 PROCEDURE — G0463 HOSPITAL OUTPT CLINIC VISIT: HCPCS | Performed by: INTERNAL MEDICINE

## 2020-09-11 PROCEDURE — G8754 DIAS BP LESS 90: HCPCS | Performed by: INTERNAL MEDICINE

## 2020-09-11 PROCEDURE — 99213 OFFICE O/P EST LOW 20 MIN: CPT | Performed by: INTERNAL MEDICINE

## 2020-09-11 PROCEDURE — G8427 DOCREV CUR MEDS BY ELIG CLIN: HCPCS | Performed by: INTERNAL MEDICINE

## 2020-09-11 RX ORDER — PAROXETINE HYDROCHLORIDE 40 MG/1
30 TABLET, FILM COATED ORAL DAILY
Qty: 90 TAB | Refills: 2 | COMMUNITY
Start: 2020-09-11 | End: 2021-08-01

## 2020-09-12 NOTE — PROGRESS NOTES
HPI:  Rani Mcclendon is a 81y.o. year old female who is here for a routine visit:    Presents for a follow-up visit. Her recent bone density showed decline in her bone density particular in her wrist.  She had previously received 2 years of Reclast but has been off of it for about 2 years. She has been taking her calcium and vitamin D. She took 1 day of Paxil 40 mg and decided it was sedating and returned to her usual 20 mg dose. She continues to deal with issues with her  and stressors there. Past Medical History:   Diagnosis Date    Anxiety     Anxiety state, unspecified 5/3/2010    Atypical chest pain     Breast cancer metastasized to axillary lymph node ([address] 9/3/2014    Cancer ([address] 1998    endometrial cancer and breast    DJD (degenerative joint disease)     Endometrial cancer ([facility] 3/20/2014    GERD (gastroesophageal reflux disease)     Hypertension     Nausea & vomiting     OA (osteoarthritis) 5/3/2010    Osteoarthritis of right hip 9/20/2012    PUD (peptic ulcer disease)     BLEEDING AFTER THR    Stenosis, spinal, lumbar        Past Surgical History:   Procedure Laterality Date    BREAST SURGERY PROCEDURE UNLISTED      lumpectomy 05/22/14    HX BLADDER SUSPENSION      HX CATARACT REMOVAL Bilateral     HX DILATION AND CURETTAGE  1998    HX GYN  2003    PELVIC FLOOR RECONSTRUCTION    HX HIP REPLACEMENT  2013    Right    HX ORTHOPAEDIC Right 2012    THR    HX OTHER SURGICAL  1999    A&P REPAIR    HX SHOULDER REPLACEMENT  2/2010    Left shoulder    HX MAXIMO AND BSO  1998    HX WISDOM TEETH EXTRACTION      VASCULAR SURGERY PROCEDURE UNLIST  1966    vein stripping left leg       Prior to Admission medications    Medication Sig Start Date End Date Taking? Authorizing Provider   PARoxetine (PAXIL) 40 mg tablet Take 1 Tab by mouth daily.  9/11/20  Yes Beatrice Hicks MD   diclofenac-miSOPROStol (ARTHROTEC 50)  mg-mcg per tablet Take 1 Tab by mouth two (2) times a day.  Patient taking differently: Take 1 Tab by mouth two (2) times daily as needed. 12/19/18  Yes Little Chester MD   omeprazole (PRILOSEC) 20 mg capsule TAKE 1 CAPSULE DAILY  Patient taking differently: as needed. 9/23/18  Yes Little Chester MD   letrozole [facility]) 2.5 mg tablet Take 1 Tab by mouth daily. 10/20/16  Yes Little Chester MD   cholecalciferol, vitamin D3, (VITAMIN D3) 2,000 unit tab Take 4,000 mcg by mouth daily. Yes Provider, Historical   docusate sodium (COLACE) 100 mg capsule Take 300 mg by mouth daily. Yes Provider, Historical   cyanocobalamin (VITAMIN B-12) 1,000 mcg sublingual tablet Take 2,000 mcg by mouth daily. Yes Provider, Historical   multivitamin (ONE A DAY) tablet Take 2 Tabs by mouth daily. Yes Provider, Historical   PARoxetine (PAXIL) 40 mg tablet Take 1 Tab by mouth daily. 8/14/20 9/11/20  Little Chester MD   valsartan (DIOVAN) 80 mg tablet TAKE 1 TABLET DAILY 12/8/17 9/11/20  Hiro Willett III, MD   zoledronic acid (RECLAST) 5 mg/100 mL soln 5 mg by IntraVENous route Once a year. 9/11/20  Provider, Historical   INULIN (FIBER GUMMIES PO) Take  by mouth. 2 gummies daily    Provider, Historical       Social History     Socioeconomic History    Marital status:      Spouse name: Not on file    Number of children: Not on file    Years of education: Not on file    Highest education level: Not on file   Occupational History    Not on file   Social Needs    Financial resource strain: Not on file    Food insecurity     Worry: Not on file     Inability: Not on file    Transportation needs     Medical: Not on file     Non-medical: Not on file   Tobacco Use    Smoking status: Never Smoker    Smokeless tobacco: Never Used   Substance and Sexual Activity    Alcohol use:  Yes     Alcohol/week: 0.8 - 1.7 standard drinks     Types: 1 - 2 Glasses of wine per week     Comment: \"VERY LITTLE\"    Drug use: No    Sexual activity: Yes     Partners: Male Lifestyle    Physical activity     Days per week: Not on file     Minutes per session: Not on file    Stress: Not on file   Relationships    Social connections     Talks on phone: Not on file     Gets together: Not on file     Attends [employer] service: Not on file     Active member of club or organization: Not on file     Attends meetings of clubs or organizations: Not on file     Relationship status: Not on file    Intimate partner violence     Fear of current or ex partner: Not on file     Emotionally abused: Not on file     Physically abused: Not on file     Forced sexual activity: Not on file   Other Topics Concern    Not on file   Social History Narrative    Not on file          ROS   Per HPI    Visit Vitals  BP (!) 146/87   Pulse 77   Ht 5' 6\" (1.676 m)   Wt 158 lb 6.4 oz (71.8 kg)   SpO2 99%   BMI 25.57 kg/m²         Physical Exam   Physical Examination: General appearance - alert, well appearing, and in no distress  Mental status - alert, oriented to person, place, and time      Assessment/Plan:  1. Osteoporosis-decline despite 2 years of Reclast.  Will switch to Prolia. Will treat for 5 years and reassess bone density in 2-year intervals. She agrees to this despite her concerns about side effects. 2.  Anxiety-we will try 30 mg of Paxil and she will let me know how that goes over the next few weeks. She was warned that it could be slightly sedating for a day or 2 when she increases the dose. Advised her to call back or return to office if symptoms worsen/change/persist.  Discussed expected course/resolution/complications of diagnosis in detail with patient. Medication risks/benefits/costs/interactions/alternatives discussed with patient. She was given an after visit summary which includes diagnoses, current medications, & vitals. She expressed understanding with the diagnosis and plan.

## 2020-09-24 ENCOUNTER — TELEPHONE (OUTPATIENT)
Dept: INTERNAL MEDICINE CLINIC | Age: 85
End: 2020-09-24

## 2020-09-24 NOTE — TELEPHONE ENCOUNTER
Spoke with patient and advised labs scanned into system, reviewed by Dr. Tashi Coelho and all wnl, pt verbalized understanding.

## 2020-09-24 NOTE — TELEPHONE ENCOUNTER
Paulette Grady (Self) [phone] (H)     Pt is requesting a call back regarding labs she says she recently had done.

## 2020-09-28 ENCOUNTER — HOSPITAL ENCOUNTER (OUTPATIENT)
Dept: INFUSION THERAPY | Age: 85
Discharge: HOME | End: 2020-09-28
Payer: MEDICARE

## 2020-09-28 VITALS
HEART RATE: 68 BPM | DIASTOLIC BLOOD PRESSURE: 81 MMHG | SYSTOLIC BLOOD PRESSURE: 123 MMHG | RESPIRATION RATE: 18 BRPM | TEMPERATURE: 98.2 F | OXYGEN SATURATION: 97 %

## 2020-09-28 LAB
ALBUMIN SERPL-MCNC: 4 G/DL (ref 3.5–5)
ANION GAP SERPL CALC-SCNC: 8 MMOL/L (ref 5–15)
BUN SERPL-MCNC: 16 MG/DL (ref 6–20)
BUN/CREAT SERPL: 19 (ref 12–20)
CALCIUM SERPL-MCNC: 9.1 MG/DL (ref 8.5–10.1)
CHLORIDE SERPL-SCNC: 105 MMOL/L (ref 97–108)
CO2 SERPL-SCNC: 26 MMOL/L (ref 21–32)
CREAT SERPL-MCNC: 0.83 MG/DL (ref 0.55–1.02)
GLUCOSE SERPL-MCNC: 92 MG/DL (ref 65–100)
MAGNESIUM SERPL-MCNC: 2.1 MG/DL (ref 1.6–2.4)
PHOSPHATE SERPL-MCNC: 3.6 MG/DL (ref 2.6–4.7)
PHOSPHATE SERPL-MCNC: 3.8 MG/DL (ref 2.6–4.7)
POTASSIUM SERPL-SCNC: 4.2 MMOL/L (ref 3.5–5.1)
SODIUM SERPL-SCNC: 139 MMOL/L (ref 136–145)

## 2020-09-28 PROCEDURE — 80069 RENAL FUNCTION PANEL: CPT

## 2020-09-28 PROCEDURE — 83735 ASSAY OF MAGNESIUM: CPT

## 2020-09-28 PROCEDURE — 96372 THER/PROPH/DIAG INJ SC/IM: CPT

## 2020-09-28 PROCEDURE — 74011250636 HC RX REV CODE- 250/636: Performed by: INTERNAL MEDICINE

## 2020-09-28 PROCEDURE — 36415 COLL VENOUS BLD VENIPUNCTURE: CPT

## 2020-09-28 PROCEDURE — 84100 ASSAY OF PHOSPHORUS: CPT

## 2020-09-28 RX ADMIN — DENOSUMAB 60 MG: 60 INJECTION SUBCUTANEOUS at 15:23

## 2020-09-28 NOTE — PROGRESS NOTES
[address] [facility] Visit Note    6997 Patient arrives for Prolia Injection without acute problems. Please see [facility] for complete assessment and education provided. Vital signs stable throughout and prior to discharge. Patient tolerated procedure well and was discharged without incident. Patient is aware of next [facility] appointment on 3/15/2020 Appointment card give to the Patient and Family member    Medications verified by Juan Antonio Padgett RN via [employer]:    1.  Prolia 60 mg SQ in left upper arm    Vital signs:  Patient Vitals for the past 12 hrs:   Temp Pulse Resp BP SpO2   09/28/20 1405 98.2 °F (36.8 °C) 68 18 123/81 97 %     Lab work:   Recent Results (from the past 12 hour(s))   RENAL FUNCTION PANEL    Collection Time: 09/28/20  2:15 PM   Result Value Ref Range    Sodium 139 136 - 145 mmol/L    Potassium 4.2 3.5 - 5.1 mmol/L    Chloride 105 97 - 108 mmol/L    CO2 26 21 - 32 mmol/L    Anion gap 8 5 - 15 mmol/L    Glucose 92 65 - 100 mg/dL    BUN 16 6 - 20 MG/DL    Creatinine 0.83 0.55 - 1.02 MG/DL    BUN/Creatinine ratio 19 12 - 20      GFR est AA >60 >60 ml/min/1.73m2    GFR est non-AA >60 >60 ml/min/1.73m2    Calcium 9.1 8.5 - 10.1 MG/DL    Phosphorus 3.8 2.6 - 4.7 MG/DL    Albumin 4.0 3.5 - 5.0 g/dL   MAGNESIUM    Collection Time: 09/28/20  2:15 PM   Result Value Ref Range    Magnesium 2.1 1.6 - 2.4 mg/dL   PHOSPHORUS    Collection Time: 09/28/20  2:15 PM   Result Value Ref Range    Phosphorus 3.6 2.6 - 4.7 MG/DL

## 2020-10-10 DIAGNOSIS — C50.911 MALIGNANT NEOPLASM OF RIGHT FEMALE BREAST, UNSPECIFIED ESTROGEN RECEPTOR STATUS, UNSPECIFIED SITE OF BREAST (HCC): ICD-10-CM

## 2021-03-15 ENCOUNTER — HOSPITAL ENCOUNTER (OUTPATIENT)
Dept: INFUSION THERAPY | Age: 86
Discharge: HOME | End: 2021-03-15
Payer: MEDICARE

## 2021-03-15 ENCOUNTER — OFFICE VISIT (OUTPATIENT)
Dept: INTERNAL MEDICINE CLINIC | Age: 86
End: 2021-03-15
Payer: MEDICARE

## 2021-03-15 VITALS
TEMPERATURE: 97.6 F | DIASTOLIC BLOOD PRESSURE: 84 MMHG | RESPIRATION RATE: 16 BRPM | HEART RATE: 84 BPM | SYSTOLIC BLOOD PRESSURE: 139 MMHG

## 2021-03-15 VITALS
HEART RATE: 90 BPM | RESPIRATION RATE: 16 BRPM | DIASTOLIC BLOOD PRESSURE: 82 MMHG | TEMPERATURE: 97.6 F | SYSTOLIC BLOOD PRESSURE: 136 MMHG | OXYGEN SATURATION: 98 % | WEIGHT: 155 LBS | HEIGHT: 66 IN | BODY MASS INDEX: 24.91 KG/M2

## 2021-03-15 DIAGNOSIS — F41.1 ANXIETY STATE: Primary | ICD-10-CM

## 2021-03-15 DIAGNOSIS — I10 ESSENTIAL HYPERTENSION: ICD-10-CM

## 2021-03-15 LAB
ALBUMIN SERPL-MCNC: 3.9 G/DL (ref 3.5–5)
ANION GAP SERPL CALC-SCNC: 8 MMOL/L (ref 5–15)
BUN SERPL-MCNC: 16 MG/DL (ref 6–20)
BUN/CREAT SERPL: 20 (ref 12–20)
CALCIUM SERPL-MCNC: 8.7 MG/DL (ref 8.5–10.1)
CHLORIDE SERPL-SCNC: 103 MMOL/L (ref 97–108)
CO2 SERPL-SCNC: 26 MMOL/L (ref 21–32)
CREAT SERPL-MCNC: 0.79 MG/DL (ref 0.55–1.02)
GLUCOSE SERPL-MCNC: 87 MG/DL (ref 65–100)
MAGNESIUM SERPL-MCNC: 2.2 MG/DL (ref 1.6–2.4)
PHOSPHATE SERPL-MCNC: 3.5 MG/DL (ref 2.6–4.7)
PHOSPHATE SERPL-MCNC: 3.7 MG/DL (ref 2.6–4.7)
POTASSIUM SERPL-SCNC: 4.2 MMOL/L (ref 3.5–5.1)
SODIUM SERPL-SCNC: 137 MMOL/L (ref 136–145)

## 2021-03-15 PROCEDURE — G8754 DIAS BP LESS 90: HCPCS | Performed by: INTERNAL MEDICINE

## 2021-03-15 PROCEDURE — G8419 CALC BMI OUT NRM PARAM NOF/U: HCPCS | Performed by: INTERNAL MEDICINE

## 2021-03-15 PROCEDURE — G8399 PT W/DXA RESULTS DOCUMENT: HCPCS | Performed by: INTERNAL MEDICINE

## 2021-03-15 PROCEDURE — G0463 HOSPITAL OUTPT CLINIC VISIT: HCPCS | Performed by: INTERNAL MEDICINE

## 2021-03-15 PROCEDURE — 84100 ASSAY OF PHOSPHORUS: CPT

## 2021-03-15 PROCEDURE — G8427 DOCREV CUR MEDS BY ELIG CLIN: HCPCS | Performed by: INTERNAL MEDICINE

## 2021-03-15 PROCEDURE — 80069 RENAL FUNCTION PANEL: CPT

## 2021-03-15 PROCEDURE — 99214 OFFICE O/P EST MOD 30 MIN: CPT | Performed by: INTERNAL MEDICINE

## 2021-03-15 PROCEDURE — G8510 SCR DEP NEG, NO PLAN REQD: HCPCS | Performed by: INTERNAL MEDICINE

## 2021-03-15 PROCEDURE — 1100F PTFALLS ASSESS-DOCD GE2>/YR: CPT | Performed by: INTERNAL MEDICINE

## 2021-03-15 PROCEDURE — 1090F PRES/ABSN URINE INCON ASSESS: CPT | Performed by: INTERNAL MEDICINE

## 2021-03-15 PROCEDURE — 83735 ASSAY OF MAGNESIUM: CPT

## 2021-03-15 PROCEDURE — G8536 NO DOC ELDER MAL SCRN: HCPCS | Performed by: INTERNAL MEDICINE

## 2021-03-15 PROCEDURE — 3288F FALL RISK ASSESSMENT DOCD: CPT | Performed by: INTERNAL MEDICINE

## 2021-03-15 PROCEDURE — G8752 SYS BP LESS 140: HCPCS | Performed by: INTERNAL MEDICINE

## 2021-03-15 PROCEDURE — 36415 COLL VENOUS BLD VENIPUNCTURE: CPT

## 2021-03-15 RX ORDER — DENOSUMAB 60 MG/ML
60 INJECTION SUBCUTANEOUS
COMMUNITY

## 2021-03-15 RX ORDER — BUSPIRONE HYDROCHLORIDE 10 MG/1
10 TABLET ORAL
Qty: 90 TAB | Refills: 1 | Status: SHIPPED | OUTPATIENT
Start: 2021-03-15 | End: 2021-09-02 | Stop reason: ALTCHOICE

## 2021-03-15 NOTE — PROGRESS NOTES
HPI:  Marina Myles is a 81y.o. year old female who is here for a routine visit:    Presents for a follow-up visit. Recently she has had her blood pressure checked during a stressful circumstance with her  and it was 160/107. Since then she has rechecked it several times and it is always been in the 130s over 80s or less. Her  is in hospice and is now lost quite a bit of weight. He was in healthcare at [address] [place] [address] [address] but is back in their apartment. She does have caregivers with him 20 hours a day but she does have the primary responsibility of coordinating things. She has had increased issues with anxiety. No full-blown panic attacks. She denies depression. She is having difficult time sleeping at night. She gets up to check on him and to use the bathroom several times per night.       Past Medical History:   Diagnosis Date    Anxiety     Anxiety state, unspecified 5/3/2010    Atypical chest pain     Breast cancer metastasized to axillary lymph node ([facility] [address] 9/3/2014    Cancer ([facility] [address] 1998    endometrial cancer and breast    DJD (degenerative joint disease)     Endometrial cancer ([facility] [address] 3/20/2014    GERD (gastroesophageal reflux disease)     Hypertension     Nausea & vomiting     OA (osteoarthritis) 5/3/2010    Osteoarthritis of right hip 9/20/2012    PUD (peptic ulcer disease)     BLEEDING AFTER THR    Stenosis, spinal, lumbar        Past Surgical History:   Procedure Laterality Date    HX BLADDER SUSPENSION      HX CATARACT REMOVAL Bilateral     HX DILATION AND CURETTAGE  1998    HX GYN  2003    PELVIC FLOOR RECONSTRUCTION    HX HIP REPLACEMENT  2013    Right    HX ORTHOPAEDIC Right 2012    THR    HX OTHER SURGICAL  1999    A&P REPAIR    HX SHOULDER REPLACEMENT  2/2010    Left shoulder    HX MAXIMO AND BSO  1998    HX WISDOM TEETH EXTRACTION      TX BREAST SURGERY PROCEDURE UNLISTED      lumpectomy 05/22/14    VASCULAR SURGERY PROCEDURE UNLIST  1966    vein stripping left leg Prior to Admission medications    Medication Sig Start Date End Date Taking? Authorizing Provider   denosumab (Prolia) 60 mg/mL injection 60 mg by SubCUTAneous route every 6 months. Yes Provider, Historical   busPIRone (BUSPAR) 10 mg tablet Take 1 Tab by mouth three (3) times daily as needed (anxiety). 3/15/21  Yes Carry MD Norbert   PARoxetine (PAXIL) 40 mg tablet Take 1 Tab by mouth daily. 9/11/20  Yes Carry MD Norbert   diclofenac-miSOPROStol (ARTHROTEC 50)  mg-mcg per tablet Take 1 Tab by mouth two (2) times a day. Patient taking differently: Take 1 Tab by mouth two (2) times daily as needed. 12/19/18  Yes Carry MD Norbert   omeprazole (PRILOSEC) 20 mg capsule TAKE 1 CAPSULE DAILY  Patient taking differently: as needed. 9/23/18  Yes Carry MD Norbert   letrozole [facility]) 2.5 mg tablet Take 1 Tab by mouth daily. 10/20/16  Yes Carry MD Norbert   cholecalciferol, vitamin D3, (VITAMIN D3) 2,000 unit tab Take 4,000 mcg by mouth daily. Yes Provider, Historical   docusate sodium (COLACE) 100 mg capsule Take 300 mg by mouth daily. Yes Provider, Historical   INULIN (FIBER GUMMIES PO) Take  by mouth. 2 gummies daily   Yes Provider, Historical   cyanocobalamin (VITAMIN B-12) 1,000 mcg sublingual tablet Take 2,000 mcg by mouth daily. Yes Provider, Historical   multivitamin (ONE A DAY) tablet Take 2 Tabs by mouth daily.    Yes Provider, Historical       Social History     Socioeconomic History    Marital status:      Spouse name: Not on file    Number of children: Not on file    Years of education: Not on file    Highest education level: Not on file   Occupational History    Not on file   Social Needs    Financial resource strain: Not on file    Food insecurity     Worry: Not on file     Inability: Not on file    Transportation needs     Medical: Not on file     Non-medical: Not on file   Tobacco Use    Smoking status: Never Smoker    Smokeless tobacco: Never Used   Substance and Sexual Activity   • Alcohol use: Not Currently     Alcohol/week: 0.8 - 1.7 standard drinks     Types: 1 - 2 Glasses of wine per week     Comment: \"VERY LITTLE\"   • Drug use: No   • Sexual activity: Yes     Partners: Male   Lifestyle   • Physical activity     Days per week: Not on file     Minutes per session: Not on file   • Stress: Not on file   Relationships   • Social connections     Talks on phone: Not on file     Gets together: Not on file     Attends Orthodox service: Not on file     Active member of club or organization: Not on file     Attends meetings of clubs or organizations: Not on file     Relationship status: Not on file   • Intimate partner violence     Fear of current or ex partner: Not on file     Emotionally abused: Not on file     Physically abused: Not on file     Forced sexual activity: Not on file   Other Topics Concern   • Not on file   Social History Narrative   • Not on file          ROS  Increasing her Paxil has helped the anxiety and depression overall.    Visit Vitals  /82   Pulse 90   Temp 97.6 °F (36.4 °C) (Temporal)   Resp 16   Ht 5' 6\" (1.676 m)   Wt 155 lb (70.3 kg)   SpO2 98%   BMI 25.02 kg/m²         Physical Exam   Physical Examination: General appearance - alert, well appearing, and in no distress  Chest - clear to auscultation, no wheezes, rales or rhonchi, symmetric air entry  Heart - normal rate, regular rhythm, normal S1, S2, no murmurs, rubs, clicks or gallops  Neurological - alert, oriented, normal speech, no focal findings or movement disorder noted      Assessment/Plan:  Diagnoses and all orders for this visit:    1. Anxiety state-not well controlled.  Rather than increase her Paxil, will add BuSpar and see if this is helpful.  She wants to avoid using habit-forming agents due to a family history of her mom becoming habituated to diazepam.  Would also consider increasing the Paxil if this is not successful.  She will let me know how that  goes over the next 2 weeks. 2. Essential hypertension-blood pressure well controlled. Will monitor it daily and let me know readings over the next month. Other orders  -     busPIRone (BUSPAR) 10 mg tablet; Take 1 Tab by mouth three (3) times daily as needed (anxiety). Follow-up and Dispositions    · Return in about 1 month (around 4/15/2021). Advised her to call back or return to office if symptoms worsen/change/persist.  Discussed expected course/resolution/complications of diagnosis in detail with patient. Medication risks/benefits/costs/interactions/alternatives discussed with patient. She was given an after visit summary which includes diagnoses, current medications, & vitals. She expressed understanding with the diagnosis and plan.

## 2021-03-15 NOTE — PROGRESS NOTES
[facility] Lab Visit:    2286:  Pt arrived ambulatory and in no distress, labs drawn per Hetal Govea RN. Departed [facility] ambulatory and in no distress. Visit Vitals  /84 (BP 1 Location: Left arm, BP Patient Position: Sitting)   Pulse 84   Temp 97.6 °F (36.4 °C)   Resp 16       Labs available in CC once resulted.

## 2021-03-22 ENCOUNTER — HOSPITAL ENCOUNTER (OUTPATIENT)
Dept: INFUSION THERAPY | Age: 86
Discharge: HOME | End: 2021-03-22
Payer: MEDICARE

## 2021-03-22 VITALS
HEART RATE: 108 BPM | RESPIRATION RATE: 16 BRPM | TEMPERATURE: 98.1 F | DIASTOLIC BLOOD PRESSURE: 67 MMHG | SYSTOLIC BLOOD PRESSURE: 129 MMHG | OXYGEN SATURATION: 98 %

## 2021-03-22 PROCEDURE — 74011250636 HC RX REV CODE- 250/636: Performed by: INTERNAL MEDICINE

## 2021-03-22 PROCEDURE — 96372 THER/PROPH/DIAG INJ SC/IM: CPT

## 2021-03-22 RX ADMIN — DENOSUMAB 60 MG: 60 INJECTION SUBCUTANEOUS at 14:13

## 2021-03-22 NOTE — PROGRESS NOTES
[address] [facility] @ [facility] Visit Note    1400 Patient arrives for Prolia injection without acute problems. Please see [facility] for complete assessment and education provided. Prior to treatment, patient was screened for COVID 19. Patient denies any signs or symptoms of COVID. Denies any known physical contact with anyone diagnosed with, or with pending or positive COVID test. Denies a pending or positive COVID test himself. Vital signs stable throughout and prior to discharge. Patient tolerated procedure well and was discharged without incident. Patient is aware of next [facility] appointment on 09/20/2021 Appointment card give to the Patient    Medications verified by Ashley Lee RN via M[employer]:    1. Prolia 60 mg SQ    Vital signs:   Patient Vitals for the past 12 hrs:   Temp Pulse Resp BP   03/22/21 1401 98.1 °F (36.7 °C) (!) 108 16 129/67         Lab work: No results found for this or any previous visit (from the past 12 hour(s)).

## 2021-06-10 ENCOUNTER — OFFICE VISIT (OUTPATIENT)
Dept: INTERNAL MEDICINE CLINIC | Age: 86
End: 2021-06-10
Payer: MEDICARE

## 2021-06-10 VITALS
TEMPERATURE: 97.6 F | BODY MASS INDEX: 25.1 KG/M2 | SYSTOLIC BLOOD PRESSURE: 126 MMHG | WEIGHT: 156.2 LBS | HEIGHT: 66 IN | RESPIRATION RATE: 18 BRPM | OXYGEN SATURATION: 98 % | DIASTOLIC BLOOD PRESSURE: 88 MMHG

## 2021-06-10 DIAGNOSIS — R73.9 HYPERGLYCEMIA: ICD-10-CM

## 2021-06-10 DIAGNOSIS — T16.1XXA FOREIGN BODY OF RIGHT EAR, INITIAL ENCOUNTER: ICD-10-CM

## 2021-06-10 DIAGNOSIS — I10 ESSENTIAL HYPERTENSION: Primary | ICD-10-CM

## 2021-06-10 DIAGNOSIS — R53.1 WEAKNESS: ICD-10-CM

## 2021-06-10 DIAGNOSIS — F41.1 ANXIETY STATE: ICD-10-CM

## 2021-06-10 DIAGNOSIS — R42 DIZZINESS: ICD-10-CM

## 2021-06-10 DIAGNOSIS — C50.911 MALIGNANT NEOPLASM OF RIGHT FEMALE BREAST, UNSPECIFIED ESTROGEN RECEPTOR STATUS, UNSPECIFIED SITE OF BREAST (HCC): ICD-10-CM

## 2021-06-10 PROCEDURE — G8536 NO DOC ELDER MAL SCRN: HCPCS | Performed by: INTERNAL MEDICINE

## 2021-06-10 PROCEDURE — 99214 OFFICE O/P EST MOD 30 MIN: CPT | Performed by: INTERNAL MEDICINE

## 2021-06-10 PROCEDURE — G8419 CALC BMI OUT NRM PARAM NOF/U: HCPCS | Performed by: INTERNAL MEDICINE

## 2021-06-10 PROCEDURE — G8427 DOCREV CUR MEDS BY ELIG CLIN: HCPCS | Performed by: INTERNAL MEDICINE

## 2021-06-10 PROCEDURE — 1090F PRES/ABSN URINE INCON ASSESS: CPT | Performed by: INTERNAL MEDICINE

## 2021-06-10 PROCEDURE — G8754 DIAS BP LESS 90: HCPCS | Performed by: INTERNAL MEDICINE

## 2021-06-10 PROCEDURE — G8432 DEP SCR NOT DOC, RNG: HCPCS | Performed by: INTERNAL MEDICINE

## 2021-06-10 PROCEDURE — G8752 SYS BP LESS 140: HCPCS | Performed by: INTERNAL MEDICINE

## 2021-06-10 PROCEDURE — 1100F PTFALLS ASSESS-DOCD GE2>/YR: CPT | Performed by: INTERNAL MEDICINE

## 2021-06-10 PROCEDURE — G8399 PT W/DXA RESULTS DOCUMENT: HCPCS | Performed by: INTERNAL MEDICINE

## 2021-06-10 PROCEDURE — G0463 HOSPITAL OUTPT CLINIC VISIT: HCPCS | Performed by: INTERNAL MEDICINE

## 2021-06-10 PROCEDURE — 3288F FALL RISK ASSESSMENT DOCD: CPT | Performed by: INTERNAL MEDICINE

## 2021-06-10 RX ORDER — DICLOFENAC SODIUM 10 MG/G
2 GEL TOPICAL 4 TIMES DAILY
Qty: 100 G | Refills: 1 | Status: SHIPPED | OUTPATIENT
Start: 2021-06-10 | End: 2022-05-06 | Stop reason: ALTCHOICE

## 2021-06-10 RX ORDER — MELOXICAM 7.5 MG/1
7.5 TABLET ORAL DAILY
Qty: 30 TABLET | Refills: 1 | Status: SHIPPED | OUTPATIENT
Start: 2021-06-10 | End: 2021-09-02 | Stop reason: ALTCHOICE

## 2021-06-10 NOTE — LETTER
6/10/2021 Ms. Felix Biggs 
[address] [phone] Dear Felix Biggs: 
 
Please find your most recent results below. Resulted Orders METABOLIC PANEL, COMPREHENSIVE Result Value Ref Range Sodium 135 (L) 136 - 145 mmol/L Potassium 4.4 3.5 - 5.1 mmol/L Chloride 102 97 - 108 mmol/L  
 CO2 26 21 - 32 mmol/L Anion gap 7 5 - 15 mmol/L Glucose 86 65 - 100 mg/dL BUN 12 6 - 20 MG/DL Creatinine 0.69 0.55 - 1.02 MG/DL  
 BUN/Creatinine ratio 17 12 - 20 GFR est AA >60 >60 ml/min/1.73m2 GFR est non-AA >60 >60 ml/min/1.73m2 Calcium 8.9 8.5 - 10.1 MG/DL Bilirubin, total 0.5 0.2 - 1.0 MG/DL  
 ALT (SGPT) 24 12 - 78 U/L  
 AST (SGOT) 17 15 - 37 U/L Alk. phosphatase 63 45 - 117 U/L Protein, total 7.2 6.4 - 8.2 g/dL Albumin 4.1 3.5 - 5.0 g/dL Globulin 3.1 2.0 - 4.0 g/dL A-G Ratio 1.3 1.1 - 2.2    
CBC WITH AUTOMATED DIFF Result Value Ref Range WBC 5.1 3.6 - 11.0 K/uL  
 RBC 4.24 3.80 - 5.20 M/uL  
 HGB 12.4 11.5 - 16.0 g/dL HCT 38.2 35.0 - 47.0 % MCV 90.1 80.0 - 99.0 FL  
 MCH 29.2 26.0 - 34.0 PG  
 MCHC 32.5 30.0 - 36.5 g/dL  
 RDW 14.6 (H) 11.5 - 14.5 % PLATELET 238 460 - 152 K/uL MPV 9.2 8.9 - 12.9 FL  
 NRBC 0.0 0  WBC ABSOLUTE NRBC 0.00 0.00 - 0.01 K/uL NEUTROPHILS 68 32 - 75 % LYMPHOCYTES 23 12 - 49 % MONOCYTES 8 5 - 13 % EOSINOPHILS 1 0 - 7 % BASOPHILS 0 0 - 1 % IMMATURE GRANULOCYTES 0 0.0 - 0.5 % ABS. NEUTROPHILS 3.4 1.8 - 8.0 K/UL  
 ABS. LYMPHOCYTES 1.2 0.8 - 3.5 K/UL  
 ABS. MONOCYTES 0.4 0.0 - 1.0 K/UL  
 ABS. EOSINOPHILS 0.1 0.0 - 0.4 K/UL  
 ABS. BASOPHILS 0.0 0.0 - 0.1 K/UL  
 ABS. IMM. GRANS. 0.0 0.00 - 0.04 K/UL  
 DF AUTOMATED    
TSH 3RD GENERATION Result Value Ref Range TSH 0.97 0.36 - 3.74 uIU/mL HEMOGLOBIN A1C WITH EAG Result Value Ref Range Hemoglobin A1c 5.8 (H) 4.0 - 5.6 % Est. average glucose 120 mg/dL RECOMMENDATIONS: 
None.  Keep up the good work! 
Recheck this test:   in  12 months. Continue with current  medications. Please call me if you have any questions: [phone] Sincerely, Katie Zurita MD

## 2021-06-11 NOTE — PROGRESS NOTES
HPI:  Dickson Silvestre is a 81y.o. year old female who is here for a routine visit:    Presents for a follow-up visit. She recently lost her  and has been under a great deal of stress associated with that. She has had some light headedness intermittently. She has had some pressure in her right ear. She is not been checking her blood pressures. Her anxiety is under much better controlled with increasing the Paxil. She did not feel much improvement with the BuSpar. She has felt generalized weakness. She has had arthritis pain in her hands as well as both of her knees. She denies any fevers or chills. No nausea or vomiting. No change in bowel or bladder habits. Past Medical History:   Diagnosis Date    Anxiety     Anxiety state, unspecified 5/3/2010    Atypical chest pain     Breast cancer metastasized to axillary lymph node ([facility] [address] 9/3/2014    Cancer ([facility] [address] 1998    endometrial cancer and breast    DJD (degenerative joint disease)     Endometrial cancer ([facility] [address] 3/20/2014    GERD (gastroesophageal reflux disease)     Hypertension     Nausea & vomiting     OA (osteoarthritis) 5/3/2010    Osteoarthritis of right hip 9/20/2012    PUD (peptic ulcer disease)     BLEEDING AFTER THR    Stenosis, spinal, lumbar        Past Surgical History:   Procedure Laterality Date    HX BLADDER SUSPENSION      HX CATARACT REMOVAL Bilateral     HX DILATION AND CURETTAGE  1998    HX GYN  2003    PELVIC FLOOR RECONSTRUCTION    HX HIP REPLACEMENT  2013    Right    HX ORTHOPAEDIC Right 2012    THR    HX OTHER SURGICAL  1999    A&P REPAIR    HX SHOULDER REPLACEMENT  2/2010    Left shoulder    HX MAXIMO AND BSO  1998    HX WISDOM TEETH EXTRACTION      [place] BREAST SURGERY PROCEDURE UNLISTED      lumpectomy 05/22/14    VASCULAR SURGERY PROCEDURE UNLIST  1966    vein stripping left leg       Prior to Admission medications    Medication Sig Start Date End Date Taking?  Authorizing Provider   meloxicam (MOBIC) 7.5 mg tablet Take 1 Tablet by mouth daily. 6/10/21  Yes Andree Maria MD   diclofenac (VOLTAREN) 1 % gel Apply 2 g to affected area four (4) times daily. 6/10/21  Yes Andree Maria MD   denosumab (Prolia) 60 mg/mL injection 60 mg by SubCUTAneous route every 6 months. Yes Provider, Historical   busPIRone (BUSPAR) 10 mg tablet Take 1 Tab by mouth three (3) times daily as needed (anxiety). 3/15/21  Yes Andree Maria MD   PARoxetine (PAXIL) 40 mg tablet Take 1 Tab by mouth daily. 9/11/20  Yes Andree Maria MD   letrozole [facility]) 2.5 mg tablet Take 1 Tab by mouth daily. 10/20/16  Yes Andree Maria MD   docusate sodium (COLACE) 100 mg capsule Take 300 mg by mouth daily. Yes Provider, Historical   multivitamin (ONE A DAY) tablet Take 2 Tabs by mouth daily. Yes Provider, Historical   diclofenac-miSOPROStol (ARTHROTEC 50)  mg-mcg per tablet Take 1 Tab by mouth two (2) times a day. Patient not taking: Reported on 6/10/2021 12/19/18   Andree Maria MD   omeprazole (PRILOSEC) 20 mg capsule TAKE 1 CAPSULE DAILY  Patient not taking: Reported on 6/10/2021 9/23/18   Andree Maria MD   cholecalciferol, vitamin D3, (VITAMIN D3) 2,000 unit tab Take 4,000 mcg by mouth daily. Patient not taking: Reported on 6/10/2021    Provider, Historical   INULIN (FIBER GUMMIES PO) Take  by mouth. 2 gummies daily  Patient not taking: Reported on 6/10/2021    Provider, Historical   cyanocobalamin (VITAMIN B-12) 1,000 mcg sublingual tablet Take 2,000 mcg by mouth daily.   Patient not taking: Reported on 6/10/2021    Provider, Historical       Social History     Socioeconomic History    Marital status:      Spouse name: Not on file    Number of children: Not on file    Years of education: Not on file    Highest education level: Not on file   Occupational History    Not on file   Tobacco Use    Smoking status: Never Smoker    Smokeless tobacco: Never Used   Substance and Sexual Activity    Alcohol use: Not Currently     Alcohol/week: 0.8 - 1.7 standard drinks     Types: 1 - 2 Glasses of wine per week     Comment: \"VERY LITTLE\"    Drug use: No    Sexual activity: Yes     Partners: Male   Other Topics Concern    Not on file   Social History Narrative    Not on file     Social Determinants of Health     Financial Resource Strain:     Difficulty of Paying Living Expenses:    Food Insecurity:     Worried About Running Out of Food in the Last Year:     Ran Out of Food in the Last Year:    Transportation Needs:     Lack of Transportation (Medical):  Lack of Transportation (Non-Medical):    Physical Activity:     Days of Exercise per Week:     Minutes of Exercise per Session:    Stress:     Feeling of Stress :    Social Connections:     Frequency of Communication with Friends and Family:     Frequency of Social Gatherings with Friends and Family:     Attends Mandaeism Services:     Active Member of Clubs or Organizations:     Attends Club or Organization Meetings:     Marital Status:    Intimate Partner Violence:     Fear of Current or Ex-Partner:     Emotionally Abused:     Physically Abused:     Sexually Abused:           ROS  Per HPI    Visit Vitals  /88   Temp 97.6 °F (36.4 °C) (Temporal)   Resp 18   Ht 5' 6\" (1.676 m)   Wt 156 lb 3.2 oz (70.9 kg)   SpO2 98%   BMI 25.21 kg/m²         Physical Exam   Physical Examination: General appearance - alert, well appearing, and in no distress  Ears - left ear normal, right ear had the tip of her hearing aid impacted in the proximal aspect of the ear canal.  This was easily removed with a pair of forceps today and symptoms dramatically improved.   Tympanic membrane and canal were normal after removal.  Mouth - mucous membranes moist, pharynx normal without lesions  Neck - supple, no significant adenopathy  Lymphatics - no palpable lymphadenopathy, no hepatosplenomegaly  Chest - clear to auscultation, no wheezes, rales or rhonchi, symmetric air entry  Heart - normal rate and regular rhythm  Abdomen - soft, nontender, nondistended, no masses or organomegaly  Neurological - alert, oriented, normal speech, no focal findings or movement disorder noted  Musculoskeletal - osteoarthritic changes noted in both hands, abnormal exam of both knees with marked crepitus and tenderness. Extremities - peripheral pulses normal, no pedal edema, no clubbing or cyanosis      Assessment/Plan:  Diagnoses and all orders for this visit:    1. Essential hypertension-blood pressure well controlled. Will continue current medications for that.  -     TSH 3RD GENERATION; Future  -     CBC WITH AUTOMATED DIFF; Future  -     METABOLIC PANEL, COMPREHENSIVE; Future    2. Weakness-likely multifactorial related to stress and decreased activity. Will refer for physical therapy.  -     REFERRAL TO PHYSICAL THERAPY    3. Dizziness-likely BPPV-will do stretching exercises with physical therapy and see if it improves. -     REFERRAL TO PHYSICAL THERAPY    4. Malignant neoplasm of right female breast, unspecified estrogen receptor status, unspecified site of breast (HCC)-up-to-date on mammogram and I confirm that it was done in September of last year. 5. Anxiety state-under better control with increased Paxil. 6. Hyperglycemia-diet controlled. Check labs to be sure that is adequate.  -     HEMOGLOBIN A1C WITH EAG; Future    7. Foreign body of right ear, initial encounter-resolved. Other orders  -     meloxicam (MOBIC) 7.5 mg tablet; Take 1 Tablet by mouth daily. -     diclofenac (VOLTAREN) 1 % gel; Apply 2 g to affected area four (4) times daily. Advised her to call back or return to office if symptoms worsen/change/persist.  Discussed expected course/resolution/complications of diagnosis in detail with patient. Medication risks/benefits/costs/interactions/alternatives discussed with patient.   She was given an after visit summary which includes diagnoses, current medications, & vitals. She expressed understanding with the diagnosis and plan.

## 2021-06-27 ENCOUNTER — TELEPHONE (OUTPATIENT)
Dept: INTERNAL MEDICINE CLINIC | Age: 86
End: 2021-06-27

## 2021-06-28 NOTE — TELEPHONE ENCOUNTER
On call received message from 0 [facility] to call about pt. I called and was told by nursing that pt was stung by bee on left and right lower arm an left upper arm. No known allergies. They gave her dose of Benadryl 25 mg PO X 1 and baking soda paste to sites. Pt is doing ok-no SOB or problems-they called to make us aware. Nurse stated family has also been notified.

## 2021-07-12 RX ORDER — LETROZOLE 2.5 MG/1
2.5 TABLET, FILM COATED ORAL DAILY
Qty: 90 TABLET | Refills: 3 | Status: SHIPPED | OUTPATIENT
Start: 2021-07-12 | End: 2022-05-06 | Stop reason: ALTCHOICE

## 2021-07-12 NOTE — TELEPHONE ENCOUNTER
Requested Prescriptions     Pending Prescriptions Disp Refills    letrozole (Femara) 2.5 mg tablet 90 Tablet 3     Sig: Take 1 Tablet by mouth daily.                [id number] - [place] [address] AT [facility] AT [facility]  [phone]

## 2021-08-01 RX ORDER — PAROXETINE HYDROCHLORIDE 40 MG/1
TABLET, FILM COATED ORAL
Qty: 90 TABLET | Refills: 2 | Status: SHIPPED | OUTPATIENT
Start: 2021-08-01 | End: 2022-05-06 | Stop reason: ALTCHOICE

## 2021-08-30 ENCOUNTER — TELEPHONE (OUTPATIENT)
Dept: INTERNAL MEDICINE CLINIC | Age: 86
End: 2021-08-30

## 2021-08-30 NOTE — TELEPHONE ENCOUNTER
Hersjuanita Selina called wanting to speak of pcp hasn't slept 3 weeks after the passing of her     Desperate to get some sleep    Pls return pt call    [phone]

## 2021-08-31 NOTE — TELEPHONE ENCOUNTER
Patient already takes Melatonin 20 mg at night (2 gummies of 10mg each). Pt states Dr. Marva Powers prescribed buspirone 10mg 3 months ago- patient was told by physical therapist that the she may have a seizure if she takes the this with her Paxil. Patient is not taking the Buspirone. Will forward to Dr. Marva Powers for further recommendations.

## 2021-09-02 ENCOUNTER — OFFICE VISIT (OUTPATIENT)
Dept: INTERNAL MEDICINE CLINIC | Age: 86
End: 2021-09-02
Payer: MEDICARE

## 2021-09-02 VITALS
HEART RATE: 98 BPM | WEIGHT: 158.4 LBS | DIASTOLIC BLOOD PRESSURE: 71 MMHG | RESPIRATION RATE: 16 BRPM | SYSTOLIC BLOOD PRESSURE: 103 MMHG | BODY MASS INDEX: 25.46 KG/M2 | TEMPERATURE: 97.5 F | HEIGHT: 66 IN | OXYGEN SATURATION: 95 %

## 2021-09-02 DIAGNOSIS — I10 ESSENTIAL HYPERTENSION: ICD-10-CM

## 2021-09-02 DIAGNOSIS — F51.04 PSYCHOPHYSIOLOGICAL INSOMNIA: Primary | ICD-10-CM

## 2021-09-02 DIAGNOSIS — F43.21 GRIEF: ICD-10-CM

## 2021-09-02 DIAGNOSIS — F41.1 ANXIETY STATE: ICD-10-CM

## 2021-09-02 DIAGNOSIS — R53.1 WEAKNESS: ICD-10-CM

## 2021-09-02 DIAGNOSIS — G89.29 CHRONIC BILATERAL LOW BACK PAIN WITHOUT SCIATICA: ICD-10-CM

## 2021-09-02 DIAGNOSIS — M54.50 CHRONIC BILATERAL LOW BACK PAIN WITHOUT SCIATICA: ICD-10-CM

## 2021-09-02 PROCEDURE — G8536 NO DOC ELDER MAL SCRN: HCPCS | Performed by: INTERNAL MEDICINE

## 2021-09-02 PROCEDURE — G0463 HOSPITAL OUTPT CLINIC VISIT: HCPCS | Performed by: INTERNAL MEDICINE

## 2021-09-02 PROCEDURE — G8752 SYS BP LESS 140: HCPCS | Performed by: INTERNAL MEDICINE

## 2021-09-02 PROCEDURE — G8399 PT W/DXA RESULTS DOCUMENT: HCPCS | Performed by: INTERNAL MEDICINE

## 2021-09-02 PROCEDURE — G8754 DIAS BP LESS 90: HCPCS | Performed by: INTERNAL MEDICINE

## 2021-09-02 PROCEDURE — G8427 DOCREV CUR MEDS BY ELIG CLIN: HCPCS | Performed by: INTERNAL MEDICINE

## 2021-09-02 PROCEDURE — 1090F PRES/ABSN URINE INCON ASSESS: CPT | Performed by: INTERNAL MEDICINE

## 2021-09-02 PROCEDURE — G8419 CALC BMI OUT NRM PARAM NOF/U: HCPCS | Performed by: INTERNAL MEDICINE

## 2021-09-02 PROCEDURE — G8510 SCR DEP NEG, NO PLAN REQD: HCPCS | Performed by: INTERNAL MEDICINE

## 2021-09-02 PROCEDURE — 99214 OFFICE O/P EST MOD 30 MIN: CPT | Performed by: INTERNAL MEDICINE

## 2021-09-02 PROCEDURE — 1101F PT FALLS ASSESS-DOCD LE1/YR: CPT | Performed by: INTERNAL MEDICINE

## 2021-09-02 RX ORDER — TRAZODONE HYDROCHLORIDE 50 MG/1
50-100 TABLET ORAL
Qty: 60 TABLET | Refills: 1 | Status: SHIPPED | OUTPATIENT
Start: 2021-09-02 | End: 2021-09-14 | Stop reason: DRUGHIGH

## 2021-09-02 NOTE — PROGRESS NOTES
Chief Complaint   Patient presents with    Sleep Problem     follow  up    Medication Evaluation     1. Have you been to the ER, urgent care clinic since your last visit? Hospitalized since your last visit? No    2. Have you seen or consulted any other health care providers outside of the [address] [place] [address] since your last visit? Include any pap smears or colon screening.  No

## 2021-09-03 NOTE — PROGRESS NOTES
HPI:  Elsa Riddle is a 81y.o. year old female who is here for follow-up visit. She is continued to have pain across her lower back. She would like to continue doing physical therapy but it was discontinued due to some recent interruption in her appointments. The pain is across her lower back without any radicular pain. She has been depressed and suffering with grief since the loss of her  3 months ago. She has had a lot of difficulty with sleeping at night. She has difficult time with getting to sleep and staying asleep. She does feel anxious during the day. She did not take the buspirone as she was concerned about the interaction with Paxil. She has been taking 20 mg a night of melatonin with no success.       Past Medical History:   Diagnosis Date    Anxiety     Anxiety state, unspecified 5/3/2010    Atypical chest pain     Breast cancer metastasized to axillary lymph node ([facility] [address] 9/3/2014    Cancer ([facility] [address] 1998    endometrial cancer and breast    DJD (degenerative joint disease)     Endometrial cancer ([facility] [address] 3/20/2014    GERD (gastroesophageal reflux disease)     Hypertension     Nausea & vomiting     OA (osteoarthritis) 5/3/2010    Osteoarthritis of right hip 9/20/2012    PUD (peptic ulcer disease)     BLEEDING AFTER THR    Stenosis, spinal, lumbar        Past Surgical History:   Procedure Laterality Date    HX BLADDER SUSPENSION      HX CATARACT REMOVAL Bilateral     HX DILATION AND CURETTAGE  1998    HX GYN  2003    PELVIC FLOOR RECONSTRUCTION    HX HIP REPLACEMENT  2013    Right    HX ORTHOPAEDIC Right 2012    THR    HX OTHER SURGICAL  1999    A&P REPAIR    HX SHOULDER REPLACEMENT  2/2010    Left shoulder    HX MAXIMO AND BSO  1998    HX WISDOM TEETH EXTRACTION      [place] BREAST SURGERY PROCEDURE UNLISTED      lumpectomy 05/22/14    VASCULAR SURGERY PROCEDURE UNLIST  1966    vein stripping left leg       Prior to Admission medications    Medication Sig Start Date End Date Taking? Authorizing Provider   traZODone (DESYREL) 50 mg tablet Take 1-2 Tablets by mouth nightly. 9/2/21  Yes Eliud Farley MD   PARoxetine (PAXIL) 40 mg tablet TAKE 1 TABLET BY MOUTH DAILY 8/1/21  Yes Sandra Peoples III, MD   letrozole (Femara) 2.5 mg tablet Take 1 Tablet by mouth daily. 7/12/21  Yes Eliud Farley MD   diclofenac (VOLTAREN) 1 % gel Apply 2 g to affected area four (4) times daily. 6/10/21  Yes Eliud Farley MD   denosumab (Prolia) 60 mg/mL injection 60 mg by SubCUTAneous route every 6 months. Yes Provider, Historical   docusate sodium (COLACE) 100 mg capsule Take 300 mg by mouth daily. Yes Provider, Historical   multivitamin (ONE A DAY) tablet Take 2 Tabs by mouth daily. Yes Provider, Historical   meloxicam (MOBIC) 7.5 mg tablet Take 1 Tablet by mouth daily. 6/10/21 9/2/21  Sandra Peoples III, MD   busPIRone (BUSPAR) 10 mg tablet Take 1 Tab by mouth three (3) times daily as needed (anxiety). 3/15/21 9/2/21  Eliud Farley MD   diclofenac-miSOPROStol (ARTHROTEC 50)  mg-mcg per tablet Take 1 Tab by mouth two (2) times a day. 12/19/18 9/2/21  Eliud Farley MD   omeprazole (PRILOSEC) 20 mg capsule TAKE 1 CAPSULE DAILY 9/23/18 9/2/21  Sandra Peoples III, MD   cholecalciferol, vitamin D3, (VITAMIN D3) 2,000 unit tab Take 4,000 mcg by mouth daily. 9/2/21  Provider, Historical   INULIN (FIBER GUMMIES PO) Take  by mouth. 2 gummies daily   9/2/21  Provider, Historical   cyanocobalamin (VITAMIN B-12) 1,000 mcg sublingual tablet Take 2,000 mcg by mouth daily.   Patient not taking: Reported on 6/10/2021  9/2/21  Provider, Historical       Social History     Socioeconomic History    Marital status:      Spouse name: Not on file    Number of children: Not on file    Years of education: Not on file    Highest education level: Not on file   Occupational History    Not on file   Tobacco Use    Smoking status: Never Smoker    Smokeless tobacco: Never Used Vaping Use    Vaping Use: Never used   Substance and Sexual Activity    Alcohol use: Not Currently     Alcohol/week: 0.8 - 1.7 standard drinks     Types: 1 - 2 Glasses of wine per week     Comment: \"VERY LITTLE\"    Drug use: No    Sexual activity: Yes     Partners: Male   Other Topics Concern    Not on file   Social History Narrative    Not on file     Social Determinants of Health     Financial Resource Strain:     Difficulty of Paying Living Expenses:    Food Insecurity:     Worried About Running Out of Food in the Last Year:     Ran Out of Food in the Last Year:    Transportation Needs:     Lack of Transportation (Medical):      Lack of Transportation (Non-Medical):    Physical Activity:     Days of Exercise per Week:     Minutes of Exercise per Session:    Stress:     Feeling of Stress :    Social Connections:     Frequency of Communication with Friends and Family:     Frequency of Social Gatherings with Friends and Family:     Attends Muslim Services:     Active Member of Clubs or Organizations:     Attends Club or Organization Meetings:     Marital Status:    Intimate Partner Violence:     Fear of Current or Ex-Partner:     Emotionally Abused:     Physically Abused:     Sexually Abused:           ROS  Per HPI    Visit Vitals  /71 (BP 1 Location: Left upper arm, BP Patient Position: Sitting, BP Cuff Size: Adult)   Pulse 98   Temp 97.5 °F (36.4 °C) (Temporal)   Resp 16   Ht 5' 6\" (1.676 m)   Wt 158 lb 6.4 oz (71.8 kg)   SpO2 95%   BMI 25.57 kg/m²         Physical Exam   Physical Examination: General appearance - alert, well appearing, and in no distress  Ears - bilateral TM's and external ear canals normal  Chest - clear to auscultation, no wheezes, rales or rhonchi, symmetric air entry  Heart - normal rate, regular rhythm, normal S1, S2, no murmurs, rubs, clicks or gallops  Neurological - alert, oriented, normal speech, no focal findings or movement disorder noted, motor and sensory grossly normal bilaterally      Assessment/Plan:  Diagnoses and all orders for this visit:    1. Psychophysiological insomnia-discussed options. At this point will try trazodone at night and see if that is helpful and we will not give her too much sedation that she is at fall risk. She will let me know how the sleep goes in the next 2 weeks. 2. Essential hypertension-blood pressure well controlled on current meds. 3. Marti Koyanagi is about to start a grief support group and I encouraged her to do that. 4. Anxiety state-continue Paxil. Discontinue buspirone as she does not want to take that. 5. Weakness-referral to physical therapy for back pain and weakness.  -     REFERRAL TO PHYSICAL THERAPY    6. Chronic bilateral low back pain without sciatica  -     REFERRAL TO PHYSICAL THERAPY    Other orders  -     traZODone (DESYREL) 50 mg tablet; Take 1-2 Tablets by mouth nightly. Follow-up and Dispositions    · Return in about 2 weeks (around 9/16/2021). Advised her to call back or return to office if symptoms worsen/change/persist.  Discussed expected course/resolution/complications of diagnosis in detail with patient. Medication risks/benefits/costs/interactions/alternatives discussed with patient. She was given an after visit summary which includes diagnoses, current medications, & vitals. She expressed understanding with the diagnosis and plan.

## 2021-09-13 ENCOUNTER — TELEPHONE (OUTPATIENT)
Dept: INTERNAL MEDICINE CLINIC | Age: 86
End: 2021-09-13

## 2021-09-13 NOTE — TELEPHONE ENCOUNTER
Per Dr. Rhona Lancaster change trazodone to oral suspension 10 mg/1mL qhs for sleep. Attempted to contact patient, unsuccessful.    -Left message to return call.

## 2021-09-13 NOTE — TELEPHONE ENCOUNTER
Pt returned call - spoke w/ patient she states she cannot tolerate the trazodone that was prescribed for sleep on 9/2/2021. States she could stay in bed all day it makes her so tired and she has had multiple near falls where she has to catch herself on the walls. She has cut the pill in half with the same result - she has been using a walker as caution for fall prevention. States the only other med she has tried in the past for her sleep issues has been Melatonin 20 mg and it did nothing. She is desperate for an alternative before leaving [facility] on the 19th to bury her . Advised would review w/ PCP and return call today with advice.

## 2021-09-13 NOTE — TELEPHONE ENCOUNTER
Reason for call:  Trazodone having side effects such as making PT sleepy or feeling like she's about to fall    Is this a new problem: yes  Date of last appointment:  9/2/2021     Can we respond via [employer]LANE: no     Best call back number: [phone]

## 2021-09-14 NOTE — TELEPHONE ENCOUNTER
Spoke with patient and reviewed Dr. Katie Akins recommendations - rx sent to [facility] per pt requests. Orders Placed This Encounter    traZODone (DESYREL) 10 mg/mL susp 10 mg/mL oral suspension (compounded)     Sig: Take 1 mL by mouth nightly for 30 days. Dispense:  30 mL     Refill:  0     The above orders were approved via [facility] per Dr. Olivia Mendez, III.

## 2021-09-20 ENCOUNTER — APPOINTMENT (OUTPATIENT)
Dept: INFUSION THERAPY | Age: 86
End: 2021-09-20
Payer: MEDICARE

## 2021-09-22 NOTE — PROGRESS NOTES
New/updated order required for patient's upcoming [facility] appointment.  Faxed doctor's office on 09/22/21 at [address] PM.      Shun Marina, PharmD

## 2021-09-24 ENCOUNTER — TELEPHONE (OUTPATIENT)
Dept: INTERNAL MEDICINE CLINIC | Age: 86
End: 2021-09-24

## 2021-09-24 ENCOUNTER — HOSPITAL ENCOUNTER (OUTPATIENT)
Dept: INFUSION THERAPY | Age: 86
Discharge: HOME | End: 2021-09-24
Payer: MEDICARE

## 2021-09-24 VITALS
OXYGEN SATURATION: 99 % | TEMPERATURE: 98 F | HEART RATE: 105 BPM | SYSTOLIC BLOOD PRESSURE: 129 MMHG | DIASTOLIC BLOOD PRESSURE: 81 MMHG

## 2021-09-24 DIAGNOSIS — M16.11 OSTEOARTHRITIS OF RIGHT HIP, UNSPECIFIED OSTEOARTHRITIS TYPE: Primary | ICD-10-CM

## 2021-09-24 DIAGNOSIS — M81.0 AGE-RELATED OSTEOPOROSIS WITHOUT CURRENT PATHOLOGICAL FRACTURE: ICD-10-CM

## 2021-09-24 LAB
ALBUMIN SERPL-MCNC: 3.7 G/DL (ref 3.5–5)
ANION GAP SERPL CALC-SCNC: 7 MMOL/L (ref 5–15)
BUN SERPL-MCNC: 16 MG/DL (ref 6–20)
BUN/CREAT SERPL: 20 (ref 12–20)
CALCIUM SERPL-MCNC: 9.4 MG/DL (ref 8.5–10.1)
CHLORIDE SERPL-SCNC: 107 MMOL/L (ref 97–108)
CO2 SERPL-SCNC: 23 MMOL/L (ref 21–32)
CREAT SERPL-MCNC: 0.8 MG/DL (ref 0.55–1.02)
GLUCOSE SERPL-MCNC: 103 MG/DL (ref 65–100)
MAGNESIUM SERPL-MCNC: 2.2 MG/DL (ref 1.6–2.4)
PHOSPHATE SERPL-MCNC: 4.4 MG/DL (ref 2.6–4.7)
PHOSPHATE SERPL-MCNC: 4.4 MG/DL (ref 2.6–4.7)
POTASSIUM SERPL-SCNC: 4.2 MMOL/L (ref 3.5–5.1)
SODIUM SERPL-SCNC: 137 MMOL/L (ref 136–145)

## 2021-09-24 PROCEDURE — 84100 ASSAY OF PHOSPHORUS: CPT

## 2021-09-24 PROCEDURE — 36415 COLL VENOUS BLD VENIPUNCTURE: CPT

## 2021-09-24 PROCEDURE — 80069 RENAL FUNCTION PANEL: CPT

## 2021-09-24 PROCEDURE — 83735 ASSAY OF MAGNESIUM: CPT

## 2021-09-24 RX ORDER — ONDANSETRON 2 MG/ML
8 INJECTION INTRAMUSCULAR; INTRAVENOUS AS NEEDED
Status: CANCELLED | OUTPATIENT
Start: 2021-09-27

## 2021-09-24 RX ORDER — ACETAMINOPHEN 325 MG/1
650 TABLET ORAL AS NEEDED
Status: CANCELLED
Start: 2021-09-27

## 2021-09-24 RX ORDER — EPINEPHRINE 1 MG/ML
0.3 INJECTION, SOLUTION, CONCENTRATE INTRAVENOUS AS NEEDED
Status: CANCELLED | OUTPATIENT
Start: 2021-09-27

## 2021-09-24 RX ORDER — DIPHENHYDRAMINE HYDROCHLORIDE 50 MG/ML
25 INJECTION, SOLUTION INTRAMUSCULAR; INTRAVENOUS AS NEEDED
Status: CANCELLED
Start: 2021-09-27

## 2021-09-24 RX ORDER — ALBUTEROL SULFATE 0.83 MG/ML
2.5 SOLUTION RESPIRATORY (INHALATION) AS NEEDED
Status: CANCELLED
Start: 2021-09-27

## 2021-09-24 RX ORDER — HYDROCORTISONE SODIUM SUCCINATE 100 MG/2ML
100 INJECTION, POWDER, FOR SOLUTION INTRAMUSCULAR; INTRAVENOUS AS NEEDED
Status: CANCELLED | OUTPATIENT
Start: 2021-09-27

## 2021-09-24 RX ORDER — DIPHENHYDRAMINE HYDROCHLORIDE 50 MG/ML
50 INJECTION, SOLUTION INTRAMUSCULAR; INTRAVENOUS AS NEEDED
Status: CANCELLED
Start: 2021-09-27

## 2021-09-24 NOTE — PROGRESS NOTES
[address] [facility] @ [facility] VISIT NOTE    4647 Patient arrives for [facility] without acute problems. Please see connect care for complete assessment and education provided. Vital signs stable throughout and prior to discharge, Pt. Tolerated treatment well and discharged without incident. Patient/daughter is aware of next [facility] appointment on 9/27/2021. Appointment card given to patient/daughter. VITAL SIGNS Patient Vitals for the past 12 hrs:   Temp Pulse BP SpO2   09/24/21 1421 98 °F (36.7 °C) (!) 105 129/81 99 %       LAB WORK Lab results pending, please see [facility] for results.   Recent Results (from the past 12 hour(s))   RENAL FUNCTION PANEL    Collection Time: 09/24/21  2:23 PM   Result Value Ref Range    Sodium 137 136 - 145 mmol/L    Potassium 4.2 3.5 - 5.1 mmol/L    Chloride 107 97 - 108 mmol/L    CO2 23 21 - 32 mmol/L    Anion gap 7 5 - 15 mmol/L    Glucose 103 (H) 65 - 100 mg/dL    BUN 16 6 - 20 MG/DL    Creatinine 0.80 0.55 - 1.02 MG/DL    BUN/Creatinine ratio 20 12 - 20      GFR est AA >60 >60 ml/min/1.73m2    GFR est non-AA >60 >60 ml/min/1.73m2    Calcium 9.4 8.5 - 10.1 MG/DL    Phosphorus 4.4 2.6 - 4.7 MG/DL    Albumin 3.7 3.5 - 5.0 g/dL   MAGNESIUM    Collection Time: 09/24/21  2:23 PM   Result Value Ref Range    Magnesium 2.2 1.6 - 2.4 mg/dL   PHOSPHORUS    Collection Time: 09/24/21  2:23 PM   Result Value Ref Range    Phosphorus 4.4 2.6 - 4.7 MG/DL

## 2021-09-24 NOTE — TELEPHONE ENCOUNTER
Returned call to Dylan Chapa and advised patient now has a therapy plan for Ny Armstrong was able to confirm that she could see the orders in the system for this.

## 2021-09-24 NOTE — TELEPHONE ENCOUNTER
Reason for call: Please put in an order for Prolia injection    Is this a new problem: yes     Date of last appointment:  9/2/2021     Can we respond via MyChart: no    Best call back number: Azalia Parr [phone] [facility]

## 2021-09-27 ENCOUNTER — HOSPITAL ENCOUNTER (OUTPATIENT)
Dept: INFUSION THERAPY | Age: 86
Discharge: HOME | End: 2021-09-27
Payer: MEDICARE

## 2021-09-27 VITALS
SYSTOLIC BLOOD PRESSURE: 115 MMHG | HEART RATE: 80 BPM | OXYGEN SATURATION: 99 % | TEMPERATURE: 98.1 F | DIASTOLIC BLOOD PRESSURE: 75 MMHG

## 2021-09-27 DIAGNOSIS — M16.11 OSTEOARTHRITIS OF RIGHT HIP, UNSPECIFIED OSTEOARTHRITIS TYPE: ICD-10-CM

## 2021-09-27 DIAGNOSIS — M81.0 AGE-RELATED OSTEOPOROSIS WITHOUT CURRENT PATHOLOGICAL FRACTURE: Primary | ICD-10-CM

## 2021-09-27 PROCEDURE — 74011250636 HC RX REV CODE- 250/636: Performed by: INTERNAL MEDICINE

## 2021-09-27 PROCEDURE — 96372 THER/PROPH/DIAG INJ SC/IM: CPT

## 2021-09-27 RX ORDER — HYDROCORTISONE SODIUM SUCCINATE 100 MG/2ML
100 INJECTION, POWDER, FOR SOLUTION INTRAMUSCULAR; INTRAVENOUS AS NEEDED
Status: DISCONTINUED | OUTPATIENT
Start: 2021-09-27 | End: 2021-09-28 | Stop reason: HOSPADM

## 2021-09-27 RX ORDER — DIPHENHYDRAMINE HYDROCHLORIDE 50 MG/ML
25 INJECTION, SOLUTION INTRAMUSCULAR; INTRAVENOUS AS NEEDED
Status: DISCONTINUED | OUTPATIENT
Start: 2021-09-27 | End: 2021-09-28 | Stop reason: HOSPADM

## 2021-09-27 RX ORDER — ALBUTEROL SULFATE 0.83 MG/ML
2.5 SOLUTION RESPIRATORY (INHALATION) AS NEEDED
Status: DISCONTINUED | OUTPATIENT
Start: 2021-09-27 | End: 2021-09-28 | Stop reason: HOSPADM

## 2021-09-27 RX ORDER — ALBUTEROL SULFATE 0.83 MG/ML
2.5 SOLUTION RESPIRATORY (INHALATION) AS NEEDED
Status: CANCELLED
Start: 2022-03-28

## 2021-09-27 RX ORDER — HYDROCORTISONE SODIUM SUCCINATE 100 MG/2ML
100 INJECTION, POWDER, FOR SOLUTION INTRAMUSCULAR; INTRAVENOUS AS NEEDED
Status: CANCELLED | OUTPATIENT
Start: 2022-03-28

## 2021-09-27 RX ORDER — ONDANSETRON 2 MG/ML
8 INJECTION INTRAMUSCULAR; INTRAVENOUS AS NEEDED
Status: CANCELLED | OUTPATIENT
Start: 2022-03-28

## 2021-09-27 RX ORDER — ONDANSETRON 2 MG/ML
8 INJECTION INTRAMUSCULAR; INTRAVENOUS AS NEEDED
Status: DISCONTINUED | OUTPATIENT
Start: 2021-09-27 | End: 2021-09-28 | Stop reason: HOSPADM

## 2021-09-27 RX ORDER — EPINEPHRINE 1 MG/ML
0.3 INJECTION, SOLUTION, CONCENTRATE INTRAVENOUS AS NEEDED
Status: CANCELLED | OUTPATIENT
Start: 2022-03-28

## 2021-09-27 RX ORDER — ACETAMINOPHEN 325 MG/1
650 TABLET ORAL AS NEEDED
Status: DISCONTINUED | OUTPATIENT
Start: 2021-09-27 | End: 2021-09-28 | Stop reason: HOSPADM

## 2021-09-27 RX ORDER — ACETAMINOPHEN 325 MG/1
650 TABLET ORAL AS NEEDED
Status: CANCELLED
Start: 2022-03-28

## 2021-09-27 RX ORDER — DIPHENHYDRAMINE HYDROCHLORIDE 50 MG/ML
50 INJECTION, SOLUTION INTRAMUSCULAR; INTRAVENOUS AS NEEDED
Status: CANCELLED
Start: 2022-03-28

## 2021-09-27 RX ORDER — EPINEPHRINE 1 MG/ML
0.3 INJECTION, SOLUTION, CONCENTRATE INTRAVENOUS AS NEEDED
Status: DISCONTINUED | OUTPATIENT
Start: 2021-09-27 | End: 2021-09-28 | Stop reason: HOSPADM

## 2021-09-27 RX ORDER — DIPHENHYDRAMINE HYDROCHLORIDE 50 MG/ML
50 INJECTION, SOLUTION INTRAMUSCULAR; INTRAVENOUS AS NEEDED
Status: DISCONTINUED | OUTPATIENT
Start: 2021-09-27 | End: 2021-09-28 | Stop reason: HOSPADM

## 2021-09-27 RX ORDER — DIPHENHYDRAMINE HYDROCHLORIDE 50 MG/ML
25 INJECTION, SOLUTION INTRAMUSCULAR; INTRAVENOUS AS NEEDED
Status: CANCELLED
Start: 2022-03-28

## 2021-09-27 RX ADMIN — DENOSUMAB 60 MG: 60 INJECTION SUBCUTANEOUS at 14:14

## 2021-09-27 NOTE — PROGRESS NOTES
[address] [facility] @ [facility] VISIT NOTE    4617 Patient arrives for Prolia SQ without acute problems. Please see connect care for complete assessment and education provided. Vital signs stable throughout and prior to discharge, Pt. Tolerated treatment well and discharged without incident. Patient is aware of next [facility] appointment on 3/28/2021. Appointment card given to patient. Medications Verified by Moises Voss RN via St[employer]:  1. Prolia 60mg SQ left arm    VITAL SIGNS Patient Vitals for the past 12 hrs:   Temp Pulse BP SpO2   09/27/21 1425 -- 80 115/75 --   09/27/21 1356 98.1 °F (36.7 °C) 96 115/60 99 %       LAB WORK Lab results pending, please see Connect Care for results. No results found for this or any previous visit (from the past 12 hour(s)).

## 2021-09-30 ENCOUNTER — PATIENT MESSAGE (OUTPATIENT)
Dept: INTERNAL MEDICINE CLINIC | Age: 86
End: 2021-09-30

## 2021-09-30 NOTE — TELEPHONE ENCOUNTER
Reason for call: pt wants to know about her sleep medication    Is this a new problem:no  Date of last appointment:  9/2/2021     Can we respond via [facility]: no  Best call back number: [phone]

## 2021-10-03 RX ORDER — DOXEPIN HYDROCHLORIDE 10 MG/1
10 CAPSULE ORAL
Qty: 30 CAPSULE | Refills: 1 | Status: SHIPPED | OUTPATIENT
Start: 2021-10-03 | End: 2022-05-06 | Stop reason: ALTCHOICE

## 2021-11-23 ENCOUNTER — VIRTUAL VISIT (OUTPATIENT)
Dept: INTERNAL MEDICINE CLINIC | Age: 86
End: 2021-11-23
Payer: MEDICARE

## 2021-11-23 DIAGNOSIS — F51.04 PSYCHOPHYSIOLOGICAL INSOMNIA: Primary | ICD-10-CM

## 2021-11-23 PROCEDURE — G8432 DEP SCR NOT DOC, RNG: HCPCS | Performed by: NURSE PRACTITIONER

## 2021-11-23 PROCEDURE — G8427 DOCREV CUR MEDS BY ELIG CLIN: HCPCS | Performed by: NURSE PRACTITIONER

## 2021-11-23 PROCEDURE — 1090F PRES/ABSN URINE INCON ASSESS: CPT | Performed by: NURSE PRACTITIONER

## 2021-11-23 PROCEDURE — G8756 NO BP MEASURE DOC: HCPCS | Performed by: NURSE PRACTITIONER

## 2021-11-23 PROCEDURE — 99213 OFFICE O/P EST LOW 20 MIN: CPT | Performed by: NURSE PRACTITIONER

## 2021-11-23 PROCEDURE — 1101F PT FALLS ASSESS-DOCD LE1/YR: CPT | Performed by: NURSE PRACTITIONER

## 2021-11-23 PROCEDURE — G0463 HOSPITAL OUTPT CLINIC VISIT: HCPCS | Performed by: NURSE PRACTITIONER

## 2021-11-23 NOTE — PROGRESS NOTES
Jose Miguel Bocanegra is a 81 y.o. female who was seen by synchronous (real-time) audio-video technology on 11/23/2021. Assessment & Plan:   Below is the assessment and plan developed based on review of pertinent history, physical exam (if applicable), labs, studies, and medications. 1. Psychophysiological insomnia  take doxepin as prescribed by PCP  Follow up if no improvement over the next few weeks    I spent at least 23 minutes on this visit with this established patient. ([id number])  Subjective:   Jose Miguel Bocanegra was seen for Insomnia      Patient presents for ongoing insomnia since her  die in June. She has discussed this issue with Dr. Frank Flanagan in the past. She has tried melatonin with no improvement. She has recently been taking herbal Relaxium-sleep, which contains magnesium, tryptophan, valerian, ashwagandha, chamomile, passionflower, and melatonin(5mg). She has noticed it frequently causes nausea, dry heaving, and watery diarrhea when she takes it. She had watery diarrhea the past 3 days and dry heaving with some vomiting of liquid last night after taking it. She also reports it doesn't seem to aid with sleep that much. Patient was prescribed doxepin in October, but has not started the medication. She reports low energy to participate in activities at [facility]] due to lack of sleep. Prior to Admission medications    Medication Sig Start Date End Date Taking? Authorizing Provider   doxepin (SINEquan) 10 mg capsule Take 1 Capsule by mouth nightly. 10/3/21   Roverto Duarte MD   PARoxetine (PAXIL) 40 mg tablet TAKE 1 TABLET BY MOUTH DAILY 8/1/21   Indu Hendrickson III, MD   letrozole (Femara) 2.5 mg tablet Take 1 Tablet by mouth daily. 7/12/21   Indu Hendrickson III, MD   diclofenac (VOLTAREN) 1 % gel Apply 2 g to affected area four (4) times daily. 6/10/21   Roverto Duarte MD   denosumab (Prolia) 60 mg/mL injection 60 mg by SubCUTAneous route every 6 months.     Provider, Historical   docusate sodium (COLACE) 100 mg capsule Take 300 mg by mouth daily. Provider, Historical   multivitamin (ONE A DAY) tablet Take 2 Tabs by mouth daily. Provider, Historical       Patient Active Problem List   Diagnosis Code    Essential hypertension I10    Anxiety state F41.1    OA (osteoarthritis) M19.90    Hyperglycemia R73.9    Osteoarthritis of right hip M16.11    Vitamin D deficiency E55.9    Advance directive declined by patient Z68.5    History of endometrial cancer Z85.42    History of breast cancer Z85.3    Age-related osteoporosis without current pathological fracture M81.0     Patient Active Problem List    Diagnosis Date Noted    Age-related osteoporosis without current pathological fracture 09/24/2021    History of endometrial cancer 04/01/2020    History of breast cancer 04/01/2020    Advance directive declined by patient 04/11/2016    Vitamin D deficiency 12/23/2013    Osteoarthritis of right hip 09/20/2012    Hyperglycemia 03/10/2011    Essential hypertension 05/03/2010    Anxiety state 05/03/2010    OA (osteoarthritis) 05/03/2010     Current Outpatient Medications   Medication Sig Dispense Refill    doxepin (SINEquan) 10 mg capsule Take 1 Capsule by mouth nightly. 30 Capsule 1    PARoxetine (PAXIL) 40 mg tablet TAKE 1 TABLET BY MOUTH DAILY 90 Tablet 2    letrozole (Femara) 2.5 mg tablet Take 1 Tablet by mouth daily. 90 Tablet 3    diclofenac (VOLTAREN) 1 % gel Apply 2 g to affected area four (4) times daily. 100 g 1    denosumab (Prolia) 60 mg/mL injection 60 mg by SubCUTAneous route every 6 months.  docusate sodium (COLACE) 100 mg capsule Take 300 mg by mouth daily.  multivitamin (ONE A DAY) tablet Take 2 Tabs by mouth daily.        Allergies   Allergen Reactions    Oxycontin [Oxycodone] Hives    Celebrex [Celecoxib] Hives    Ciprofloxacin Rash    E-Mycin [Erythromycin] Rash    Keflex [Cephalexin] Rash    Morphine Rash    Pcn [Penicillins] Rash    Tetracycline Rash     Past Medical History:   Diagnosis Date    Age-related osteoporosis without current pathological fracture 9/24/2021    Anxiety     Anxiety state, unspecified 5/3/2010    Atypical chest pain     Breast cancer metastasized to axillary lymph node ([facility] 9/3/2014    Cancer ([facility] 1998    endometrial cancer and breast    DJD (degenerative joint disease)     Endometrial cancer ([facility] 3/20/2014    GERD (gastroesophageal reflux disease)     Hypertension     Nausea & vomiting     OA (osteoarthritis) 5/3/2010    Osteoarthritis of right hip 9/20/2012    PUD (peptic ulcer disease)     BLEEDING AFTER THR    Stenosis, spinal, lumbar      Past Surgical History:   Procedure Laterality Date    HX BLADDER SUSPENSION      HX CATARACT REMOVAL Bilateral     HX DILATION AND CURETTAGE  1998    HX GYN  2003    PELVIC FLOOR RECONSTRUCTION    HX HIP REPLACEMENT  2013    Right    HX ORTHOPAEDIC Right 2012    THR    HX OTHER SURGICAL  1999    A&P REPAIR    HX SHOULDER REPLACEMENT  2/2010    Left shoulder    HX MAXIMO AND BSO  1998    HX WISDOM TEETH EXTRACTION      MT BREAST SURGERY PROCEDURE UNLISTED      lumpectomy 05/22/14    VASCULAR SURGERY PROCEDURE UNLIST  1966    vein stripping left leg     Family History   Problem Relation Age of Onset    Stroke Mother     Lung Disease Mother     Heart Disease Father     Cancer Other         breast    Post-op Nausea/Vomiting Other     Anesth Problems Other         PONV    Other Grandchild         GENETIC DJD    No Known Problems Daughter      Social History     Tobacco Use    Smoking status: Never Smoker    Smokeless tobacco: Never Used   Substance Use Topics    Alcohol use: Not Currently     Alcohol/week: 0.8 - 1.7 standard drinks     Types: 1 - 2 Glasses of wine per week     Comment: \"VERY LITTLE\"       ROS - per HPI      Objective:   No flowsheet data found.   General: alert, cooperative, no distress   Mental  status: normal mood, behavior, speech, dress, motor activity, and thought processes, able to follow commands   Eyes: EOM intact, normal sclera   Mouth: mucous membranes moist   Neck: no visualized mass   Resp: normal effort and no respiratory distress   Neuro: no gross deficits   Musculoskeletal: normal ROM of neck and normal gait w/o ataxia   Skin: no discoloration or lesions of concern on visible areas   Psychiatric: normal affect, no hallucinations     Eliezer Rincon, was evaluated through a synchronous (real-time) audio-video encounter. The patient (or guardian if applicable) is aware that this is a billable service. Verbal consent to proceed has been obtained within the past 12 months. The visit was conducted pursuant to the emergency declaration under the [facility], [address] [place] [address] authority and the [employer] General Act. Patient identification was verified, and a caregiver was present when appropriate. The patient was located in a state where the provider was credentialed to provide care.      Erica Granado NP

## 2022-03-18 PROBLEM — Z85.42 HISTORY OF ENDOMETRIAL CANCER: Status: ACTIVE | Noted: 2020-04-01

## 2022-03-18 PROBLEM — M81.0 AGE-RELATED OSTEOPOROSIS WITHOUT CURRENT PATHOLOGICAL FRACTURE: Status: ACTIVE | Noted: 2021-09-24

## 2022-03-19 PROBLEM — Z85.3 HISTORY OF BREAST CANCER: Status: ACTIVE | Noted: 2020-04-01

## 2022-03-28 ENCOUNTER — HOSPITAL ENCOUNTER (OUTPATIENT)
Dept: INFUSION THERAPY | Age: 87
Discharge: HOME | End: 2022-03-28

## 2022-04-06 ENCOUNTER — HOSPITAL ENCOUNTER (OUTPATIENT)
Dept: INFUSION THERAPY | Age: 87
Discharge: HOME | End: 2022-04-06
Payer: MEDICARE

## 2022-04-06 ENCOUNTER — TELEPHONE (OUTPATIENT)
Dept: INTERNAL MEDICINE CLINIC | Age: 87
End: 2022-04-06

## 2022-04-06 VITALS
HEART RATE: 93 BPM | DIASTOLIC BLOOD PRESSURE: 80 MMHG | SYSTOLIC BLOOD PRESSURE: 130 MMHG | TEMPERATURE: 98 F | OXYGEN SATURATION: 100 %

## 2022-04-06 DIAGNOSIS — M81.0 AGE-RELATED OSTEOPOROSIS WITHOUT CURRENT PATHOLOGICAL FRACTURE: Primary | ICD-10-CM

## 2022-04-06 DIAGNOSIS — M16.11 OSTEOARTHRITIS OF RIGHT HIP, UNSPECIFIED OSTEOARTHRITIS TYPE: ICD-10-CM

## 2022-04-06 LAB
ALBUMIN SERPL-MCNC: 3.7 G/DL (ref 3.5–5)
ALBUMIN/GLOB SERPL: 1.1 {RATIO} (ref 1.1–2.2)
ALP SERPL-CCNC: 55 U/L (ref 45–117)
ALT SERPL-CCNC: 21 U/L (ref 12–78)
ANION GAP SERPL CALC-SCNC: 3 MMOL/L (ref 5–15)
AST SERPL-CCNC: 15 U/L (ref 15–37)
BILIRUB SERPL-MCNC: 0.5 MG/DL (ref 0.2–1)
BUN SERPL-MCNC: 10 MG/DL (ref 6–20)
BUN/CREAT SERPL: 12 (ref 12–20)
CALCIUM SERPL-MCNC: 8.9 MG/DL (ref 8.5–10.1)
CHLORIDE SERPL-SCNC: 104 MMOL/L (ref 97–108)
CO2 SERPL-SCNC: 27 MMOL/L (ref 21–32)
CREAT SERPL-MCNC: 0.83 MG/DL (ref 0.55–1.02)
GLOBULIN SER CALC-MCNC: 3.3 G/DL (ref 2–4)
GLUCOSE SERPL-MCNC: 128 MG/DL (ref 65–100)
POTASSIUM SERPL-SCNC: 3.9 MMOL/L (ref 3.5–5.1)
PROT SERPL-MCNC: 7 G/DL (ref 6.4–8.2)
SODIUM SERPL-SCNC: 134 MMOL/L (ref 136–145)

## 2022-04-06 PROCEDURE — 80053 COMPREHEN METABOLIC PANEL: CPT

## 2022-04-06 PROCEDURE — 36415 COLL VENOUS BLD VENIPUNCTURE: CPT

## 2022-04-06 PROCEDURE — 96372 THER/PROPH/DIAG INJ SC/IM: CPT

## 2022-04-06 PROCEDURE — 74011250636 HC RX REV CODE- 250/636: Performed by: INTERNAL MEDICINE

## 2022-04-06 RX ORDER — EPINEPHRINE 1 MG/ML
0.3 INJECTION, SOLUTION, CONCENTRATE INTRAVENOUS AS NEEDED
Status: CANCELLED | OUTPATIENT
Start: 2022-09-28

## 2022-04-06 RX ORDER — DIPHENHYDRAMINE HYDROCHLORIDE 50 MG/ML
50 INJECTION, SOLUTION INTRAMUSCULAR; INTRAVENOUS AS NEEDED
Status: CANCELLED
Start: 2022-09-28

## 2022-04-06 RX ORDER — ALBUTEROL SULFATE 0.83 MG/ML
2.5 SOLUTION RESPIRATORY (INHALATION) AS NEEDED
Status: CANCELLED
Start: 2022-09-28

## 2022-04-06 RX ORDER — HYDROCORTISONE SODIUM SUCCINATE 100 MG/2ML
100 INJECTION, POWDER, FOR SOLUTION INTRAMUSCULAR; INTRAVENOUS AS NEEDED
Status: CANCELLED | OUTPATIENT
Start: 2022-09-28

## 2022-04-06 RX ORDER — ACETAMINOPHEN 325 MG/1
650 TABLET ORAL AS NEEDED
Status: CANCELLED
Start: 2022-09-28

## 2022-04-06 RX ORDER — ONDANSETRON 2 MG/ML
8 INJECTION INTRAMUSCULAR; INTRAVENOUS AS NEEDED
Status: CANCELLED | OUTPATIENT
Start: 2022-09-28

## 2022-04-06 RX ORDER — DIPHENHYDRAMINE HYDROCHLORIDE 50 MG/ML
25 INJECTION, SOLUTION INTRAMUSCULAR; INTRAVENOUS AS NEEDED
Status: CANCELLED
Start: 2022-09-28

## 2022-04-06 RX ADMIN — DENOSUMAB 60 MG: 60 INJECTION SUBCUTANEOUS at 15:15

## 2022-04-06 NOTE — PROGRESS NOTES
[facility] Peds/Adult Note                       Date: 2022    Name: Britt Pa    MRN: [id number]         : 1935    1400 Patient arrives for Prolia without acute problems. Please see [facility] for complete assessment and education provided. Prior to treatment, patient was screened for COVID 19. Patient denies any signs or symptoms of COVID. Denies any known physical contact with anyone diagnosed with, or with pending or positive COVID test. Denies a pending or positive COVID test himself. Vital signs stable throughout and prior to discharge. Patient tolerated procedure well and was discharged without incident. Patient is aware of next [facility] appointment on 10/7/2022 Appointment card give to the Patient      Ms. Marinelli's vitals were reviewed prior to and after treatment. Patient Vitals for the past 12 hrs:   Temp Pulse BP SpO2   22 1359 98 °F (36.7 °C) 93 130/80 100 %         Lab results were obtained and reviewed. Recent Results (from the past 12 hour(s))   METABOLIC PANEL, COMPREHENSIVE    Collection Time: 22  2:05 PM   Result Value Ref Range    Sodium 134 (L) 136 - 145 mmol/L    Potassium 3.9 3.5 - 5.1 mmol/L    Chloride 104 97 - 108 mmol/L    CO2 27 21 - 32 mmol/L    Anion gap 3 (L) 5 - 15 mmol/L    Glucose 128 (H) 65 - 100 mg/dL    BUN 10 6 - 20 MG/DL    Creatinine 0.83 0.55 - 1.02 MG/DL    BUN/Creatinine ratio 12 12 - 20      GFR est AA >60 >60 ml/min/1.73m2    GFR est non-AA >60 >60 ml/min/1.73m2    Calcium 8.9 8.5 - 10.1 MG/DL    Bilirubin, total 0.5 0.2 - 1.0 MG/DL    ALT (SGPT) 21 12 - 78 U/L    AST (SGOT) 15 15 - 37 U/L    Alk.  phosphatase 55 45 - 117 U/L    Protein, total 7.0 6.4 - 8.2 g/dL    Albumin 3.7 3.5 - 5.0 g/dL    Globulin 3.3 2.0 - 4.0 g/dL    A-G Ratio 1.1 1.1 - 2.2         Medications given:   Medications Administered     denosumab (PROLIA) injection 60 mg     Admin Date  2022 Action  Given Dose  60 mg Route  SubCUTAneous Administered By  [employer], Sheryl Niño RN                Ms. Sara Stevens tolerated the injection, and had no complaints. PIV flushed and removed per protocol without any difficulty. Ms. Sara Stevens was discharged from [place] [address] in stable condition. Discharge Instructions provided to patient, patient verbalized understanding but denied the request for a copy of d/c instructions.      Future Appointments   Date Time Provider Zelda Hartley   10/7/2022  2:00 PM [address] Nolan Baker RN  April 6, 2022  3:48 PM

## 2022-04-06 NOTE — TELEPHONE ENCOUNTER
Reason for call: The patient received her Prolia injection today.  When would it be safe to get her 2nd Covid booster shot     Is this a new problem: yes     Date of last appointment:  11/23/2021     Can we respond via [employer]: no    Best call back number:  [phone]

## 2022-05-05 ENCOUNTER — TELEPHONE (OUTPATIENT)
Dept: INTERNAL MEDICINE CLINIC | Age: 87
End: 2022-05-05

## 2022-05-05 NOTE — TELEPHONE ENCOUNTER
Chief Complaint   Patient presents with    Dizziness     Received incoming call from [facility] ([facility]) for triage. Patient requesting appointment for dizziness that began on Monday. Denies any n/v/falls. No CP or SOB. Scheduled for tomorrow with PCP for evaluations. Travel screen negative. Red flags reviewed - pt voiced understanding. Future Appointments   Date Time Provider Zelda Hartley   5/6/2022  2:30 PM MD YOVANA Winters BS AMB   10/7/2022  2:00 PM MARTI LANDRY [facility]     Recent Travel Screening and Travel History documentation     Travel Screening     Question   Response    In the last 10 days, have you been in contact with someone who was confirmed or suspected to have Coronavirus/COVID-19? No / Unsure    Have you had a COVID-19 viral test in the last 10 days? No    Do you have any of the following new or worsening symptoms? None of these    Have you traveled internationally or domestically in the last month?   No      Travel History   Travel since 04/05/22    No documented travel since 04/05/22

## 2022-05-06 ENCOUNTER — OFFICE VISIT (OUTPATIENT)
Dept: INTERNAL MEDICINE CLINIC | Age: 87
End: 2022-05-06
Payer: MEDICARE

## 2022-05-06 VITALS
WEIGHT: 158 LBS | BODY MASS INDEX: 25.39 KG/M2 | HEIGHT: 66 IN | DIASTOLIC BLOOD PRESSURE: 78 MMHG | TEMPERATURE: 97.6 F | SYSTOLIC BLOOD PRESSURE: 138 MMHG | RESPIRATION RATE: 16 BRPM | OXYGEN SATURATION: 98 % | HEART RATE: 94 BPM

## 2022-05-06 DIAGNOSIS — G89.29 CHRONIC RIGHT SHOULDER PAIN: ICD-10-CM

## 2022-05-06 DIAGNOSIS — H81.10 BENIGN PAROXYSMAL POSITIONAL VERTIGO, UNSPECIFIED LATERALITY: Primary | ICD-10-CM

## 2022-05-06 DIAGNOSIS — I10 ESSENTIAL HYPERTENSION: ICD-10-CM

## 2022-05-06 DIAGNOSIS — R41.3 MEMORY CHANGES: ICD-10-CM

## 2022-05-06 DIAGNOSIS — R73.9 HYPERGLYCEMIA: ICD-10-CM

## 2022-05-06 DIAGNOSIS — M70.61 TROCHANTERIC BURSITIS OF RIGHT HIP: ICD-10-CM

## 2022-05-06 DIAGNOSIS — C50.911 MALIGNANT NEOPLASM OF RIGHT FEMALE BREAST, UNSPECIFIED ESTROGEN RECEPTOR STATUS, UNSPECIFIED SITE OF BREAST (HCC): ICD-10-CM

## 2022-05-06 DIAGNOSIS — M25.511 CHRONIC RIGHT SHOULDER PAIN: ICD-10-CM

## 2022-05-06 PROCEDURE — G8536 NO DOC ELDER MAL SCRN: HCPCS | Performed by: INTERNAL MEDICINE

## 2022-05-06 PROCEDURE — G8754 DIAS BP LESS 90: HCPCS | Performed by: INTERNAL MEDICINE

## 2022-05-06 PROCEDURE — G0463 HOSPITAL OUTPT CLINIC VISIT: HCPCS | Performed by: INTERNAL MEDICINE

## 2022-05-06 PROCEDURE — G8419 CALC BMI OUT NRM PARAM NOF/U: HCPCS | Performed by: INTERNAL MEDICINE

## 2022-05-06 PROCEDURE — G8752 SYS BP LESS 140: HCPCS | Performed by: INTERNAL MEDICINE

## 2022-05-06 PROCEDURE — G8427 DOCREV CUR MEDS BY ELIG CLIN: HCPCS | Performed by: INTERNAL MEDICINE

## 2022-05-06 PROCEDURE — 1090F PRES/ABSN URINE INCON ASSESS: CPT | Performed by: INTERNAL MEDICINE

## 2022-05-06 PROCEDURE — G8510 SCR DEP NEG, NO PLAN REQD: HCPCS | Performed by: INTERNAL MEDICINE

## 2022-05-06 PROCEDURE — 1101F PT FALLS ASSESS-DOCD LE1/YR: CPT | Performed by: INTERNAL MEDICINE

## 2022-05-06 PROCEDURE — 99214 OFFICE O/P EST MOD 30 MIN: CPT | Performed by: INTERNAL MEDICINE

## 2022-05-06 RX ORDER — DICLOFENAC SODIUM AND MISOPROSTOL 50; 200 MG/1; UG/1
1 TABLET, DELAYED RELEASE ORAL
Qty: 60 TABLET | Refills: 3 | Status: SHIPPED | OUTPATIENT
Start: 2022-05-06

## 2022-05-06 RX ORDER — DOXEPIN HYDROCHLORIDE 10 MG/1
6 CAPSULE ORAL
Qty: 30 CAPSULE | Refills: 1 | COMMUNITY
Start: 2022-05-06

## 2022-05-06 RX ORDER — PAROXETINE HYDROCHLORIDE 20 MG/1
TABLET, FILM COATED ORAL DAILY
COMMUNITY

## 2022-05-06 NOTE — PROGRESS NOTES
HPI:  Hyun Garcia is a 81y.o. year old female who is here for dizziness that is been present for the last 2 days off and on. It seems to have started after she had a dental appointment for fixing a broken tooth and an extended period of time in the dental chair. Today she is much improved. She does have some ongoing issues with right shoulder discomfort between her shoulder blades as well as some pain across the right hip. Her sleep continues to be an issue. She did not try the doxepin as she was afraid to. She does note some issues with short-term memory as well. Past Medical History:   Diagnosis Date    Age-related osteoporosis without current pathological fracture 9/24/2021    Anxiety     Anxiety state, unspecified 5/3/2010    Atypical chest pain     Breast cancer metastasized to axillary lymph node ([address] 9/3/2014    Cancer ([address] 1998    endometrial cancer and breast    DJD (degenerative joint disease)     Endometrial cancer ([facility] 3/20/2014    GERD (gastroesophageal reflux disease)     Hypertension     Nausea & vomiting     OA (osteoarthritis) 5/3/2010    Osteoarthritis of right hip 9/20/2012    PUD (peptic ulcer disease)     BLEEDING AFTER THR    Stenosis, spinal, lumbar        Past Surgical History:   Procedure Laterality Date    HX BLADDER SUSPENSION      HX CATARACT REMOVAL Bilateral     HX DILATION AND CURETTAGE  1998    HX GYN  2003    PELVIC FLOOR RECONSTRUCTION    HX HIP REPLACEMENT  2013    Right    HX ORTHOPAEDIC Right 2012    THR    HX OTHER SURGICAL  1999    A&P REPAIR    HX SHOULDER REPLACEMENT  2/2010    Left shoulder    HX MAXIMO AND BSO  1998    HX WISDOM TEETH EXTRACTION      ME BREAST SURGERY PROCEDURE UNLISTED      lumpectomy 05/22/14    VASCULAR SURGERY PROCEDURE UNLIST  1966    vein stripping left leg       Prior to Admission medications    Medication Sig Start Date End Date Taking?  Authorizing Provider   PARoxetine (PaxiL) 20 mg tablet Take  by mouth daily. Yes Provider, Historical   diclofenac-miSOPROStol (ARTHROTEC 50)  mg-mcg per tablet Take 1 Tablet by mouth two (2) times daily as needed for Pain. 5/6/22  Yes Marine Gudino MD   doxepin (SINEquan) 10 mg capsule Take 1 Capsule by mouth nightly. 5/6/22  Yes Marine Gudino MD   denosumab (Prolia) 60 mg/mL injection 60 mg by SubCUTAneous route every 6 months. Yes Provider, Historical   doxepin (SINEquan) 10 mg capsule Take 1 Capsule by mouth nightly. Patient not taking: Reported on 5/6/2022 10/3/21 5/6/22  [facility] III, MD   PARoxetine (PAXIL) 40 mg tablet TAKE 1 TABLET BY MOUTH DAILY  Patient not taking: Reported on 5/6/2022 8/1/21 5/6/22  [facility], MD   letrozole (Femara) 2.5 mg tablet Take 1 Tablet by mouth daily. Patient not taking: Reported on 5/6/2022 7/12/21 5/6/22  [facility] III, MD   diclofenac (VOLTAREN) 1 % gel Apply 2 g to affected area four (4) times daily. Patient not taking: Reported on 5/6/2022 6/10/21 5/6/22  [facility] CAre MD ELIF   docusate sodium (COLACE) 100 mg capsule Take 300 mg by mouth daily. Patient not taking: Reported on 5/6/2022 5/6/22  Provider, Historical   multivitamin (ONE A DAY) tablet Take 2 Tabs by mouth daily.   Patient not taking: Reported on 5/6/2022 5/6/22  Provider, Historical       Social History     Socioeconomic History    Marital status:      Spouse name: Not on file    Number of children: Not on file    Years of education: Not on file    Highest education level: Not on file   Occupational History    Not on file   Tobacco Use    Smoking status: Never Smoker    Smokeless tobacco: Never Used   Vaping Use    Vaping Use: Never used   Substance and Sexual Activity    Alcohol use: Not Currently     Alcohol/week: 0.8 - 1.7 standard drinks     Types: 1 - 2 Glasses of wine per week     Comment: \"VERY LITTLE\"    Drug use: No    Sexual activity: Yes     Partners: Male   Other Topics Concern    Not on file Social History Narrative    Not on file     Social Determinants of Health     Financial Resource Strain:     Difficulty of Paying Living Expenses: Not on file   Food Insecurity:     Worried About Running Out of Food in the Last Year: Not on file    Deyanira of Food in the Last Year: Not on file   Transportation Needs:     Lack of Transportation (Medical): Not on file    Lack of Transportation (Non-Medical):  Not on file   Physical Activity:     Days of Exercise per Week: Not on file    Minutes of Exercise per Session: Not on file   Stress:     Feeling of Stress : Not on file   Social Connections:     Frequency of Communication with Friends and Family: Not on file    Frequency of Social Gatherings with Friends and Family: Not on file    Attends Muslim Services: Not on file    Active Member of [address] [place] [address] [employer] or Organizations: Not on file    Attends Club or Organization Meetings: Not on file    Marital Status: Not on file   Intimate Partner Violence:     Fear of Current or Ex-Partner: Not on file    Emotionally Abused: Not on file    Physically Abused: Not on file    Sexually Abused: Not on file   Housing Stability:     Unable to Pay for Housing in the Last Year: Not on file    Number of [place] in the Last Year: Not on file    Unstable Housing in the Last Year: Not on file          ROS  Per HPI    Visit Vitals  /78   Pulse 94   Temp 97.6 °F (36.4 °C) (Temporal)   Resp 16   Ht 5' 6\" (1.676 m)   Wt 158 lb (71.7 kg)   SpO2 98%   BMI 25.50 kg/m²         Physical Exam   Physical Examination: General appearance - alert, well appearing, and in no distress  Ears - bilateral TM's and external ear canals normal  Mouth - mucous membranes moist, pharynx normal without lesions  Neck - supple, no significant adenopathy, carotids upstroke normal bilaterally, no bruits  Lymphatics - no palpable lymphadenopathy, no hepatosplenomegaly  Chest - clear to auscultation, no wheezes, rales or rhonchi, symmetric air entry  Heart - normal rate and regular rhythm  Abdomen - soft, nontender, nondistended, no masses or organomegaly  Neurological - alert, oriented, normal speech, no focal findings or movement disorder noted, motor and sensory grossly normal bilaterally  Musculoskeletal -there is tenderness across the right trapezius and levator scapulae. There is also tenderness across the right greater trochanteric bursa. No redness or warmth. Extremities - peripheral pulses normal, no pedal edema, no clubbing or cyanosis      Assessment/Plan:  Diagnoses and all orders for this visit:    1. Benign paroxysmal positional vertigo, unspecified laterality-appears to be resolving. We will do exercises if symptoms persist.    2. Malignant neoplasm of right female breast, unspecified estrogen receptor status, unspecified site of breast (HCC)-in remission. 3. Essential hypertension-blood pressure well controlled. -     CBC WITH AUTOMATED DIFF; Future  -     HEMOGLOBIN A1C WITH EAG; Future  -     TSH 3RD GENERATION; Future    4. Trochanteric bursitis of right hip-refer to physical therapy. We will also try diclofenac with misoprostol as needed. -     REFERRAL TO PHYSICAL THERAPY    5. Chronic right shoulder pain- physical therapy per above. -     REFERRAL TO PHYSICAL THERAPY    6. Memory changes-consider neuropsych testing. We will try medications for her sleep and see if that improves matters. 7. Hyperglycemia-concerned about blood sugars due to her increased sweets use. Will check labs for that. -     HEMOGLOBIN A1C WITH EAG; Future    Other orders  -     diclofenac-miSOPROStol (ARTHROTEC 50)  mg-mcg per tablet; Take 1 Tablet by mouth two (2) times daily as needed for Pain. Advised her to call back or return to office if symptoms worsen/change/persist.  Discussed expected course/resolution/complications of diagnosis in detail with patient.     Medication risks/benefits/costs/interactions/alternatives discussed with patient. She was given an after visit summary which includes diagnoses, current medications, & vitals. She expressed understanding with the diagnosis and plan.

## 2022-05-06 NOTE — PATIENT INSTRUCTIONS
Hip Bursitis: Exercises  Introduction  Here are some examples of exercises for you to try. The exercises may be suggested for a condition or for rehabilitation. Start each exercise slowly. Ease off the exercises if you start to have pain. You will be told when to start these exercises and which ones will work best for you. How to do the exercises  Hip rotator stretch    1. Lie on your back with both knees bent and your feet flat on the floor. 2. Put the ankle of your affected leg on your opposite thigh near your knee. 3. Use your hand to gently push your knee away from your body until you feel a gentle stretch around your hip. 4. Hold the stretch for 15 to 30 seconds. 5. Repeat 2 to 4 times. 6. Repeat steps 1 through 5, but this time use your hand to gently pull your knee toward your opposite shoulder. Iliotibial band stretch    1. Lean sideways against a wall. If you are not steady on your feet, hold on to a chair or counter. 2. Stand on the leg with the affected hip, with that leg close to the wall. Then cross your other leg in front of it. 3. Let your affected hip drop out to the side of your body and against wall. Then lean away from your affected hip until you feel a stretch. 4. Hold the stretch for 15 to 30 seconds. 5. Repeat 2 to 4 times. Straight-leg raises to the outside    1. Lie on your side, with your affected hip on top. 2. Tighten the front thigh muscles of your top leg to keep your knee straight. 3. Keep your hip and your leg straight in line with the rest of your body, and keep your knee pointing forward. Do not drop your hip back. 4. Lift your top leg straight up toward the ceiling, about 12 inches off the floor. Hold for about 6 seconds, then slowly lower your leg. 5. Repeat 8 to 12 times. Clamshell    1. Lie on your side, with your affected hip on top and your head propped on a pillow. Keep your feet and knees together and your knees bent.   2. Raise your top knee, but keep your feet together. Do not let your hips roll back. Your legs should open up like a clamshell. 3. Hold for 6 seconds. 4. Slowly lower your knee back down. Rest for 10 seconds. 5. Repeat 8 to 12 times. Follow-up care is a key part of your treatment and safety. Be sure to make and go to all appointments, and call your doctor if you are having problems. It's also a good idea to know your test results and keep a list of the medicines you take. Where can you learn more? Go to http://www.calvillo.com/  Enter H674 in the search box to learn more about \"Hip Bursitis: Exercises. \"  Current as of: July 1, 2021               Content Version: 13.2  © 2006-2022 [employer]. Care instructions adapted under license by [employer] (which disclaims liability or warranty for this information). If you have questions about a medical condition or this instruction, always ask your healthcare professional. [place] [address] any warranty or liability for your use of this information. Rhomboid Muscle Strain: Rehab Exercises  Introduction  Here are some examples of exercises for you to try. The exercises may be suggested for a condition or for rehabilitation. Start each exercise slowly. Ease off the exercises if you start to have pain. You will be told when to start these exercises and which ones will work best for you. How to do the exercises  Lower neck and upper back (rhomboid) stretch    7. Stretch your arms out in front of your body. Clasp one hand on top of your other hand. 8. Gently reach out so that you feel your shoulder blades stretching away from each other. 9. Gently bend your head forward. 10. Hold for 15 to 30 seconds. 11. Repeat 2 to 4 times. Resisted rows    For this exercise, you will need elastic exercise material, such as surgical tubing or Thera-Band. 6. Put the band around a solid object, such as a bedpost, at about waist level.  Stand facing where you have placed the band. Hold equal lengths of the band in each hand. 7. Start with your arms held out in front of you. 8. Pull the bands back, and move your shoulder blades together. As you finish, your elbows should be at your side and bent at 90 degrees (like the angle of the letter \"L\"). 9. Return to the starting position. 10. Repeat 8 to 12 times. Neck stretches    6. Look straight ahead, and tip your right ear to your right shoulder. Do not let your left shoulder rise as you tip your head to the right. 7. Hold for 15 to 30 seconds. 8. Tilt your head to the left. Do not let your right shoulder rise as you tip your head to the left. 9. Hold for 15 to 30 seconds. 10. Repeat 2 to 4 times to each side. Neck rotation    6. Sit in a firm chair, or stand up straight. 7. Keeping your chin level, turn your head to the right, and hold for 15 to 30 seconds. 8. Turn your head to the left, and hold for 15 to 30 seconds. 9. Repeat 2 to 4 times to each side. Follow-up care is a key part of your treatment and safety. Be sure to make and go to all appointments, and call your doctor if you are having problems. It's also a good idea to know your test results and keep a list of the medicines you take. Where can you learn more? Go to http://www.gray.com/  Enter A572 in the search box to learn more about \"Rhomboid Muscle Strain: Rehab Exercises. \"  Current as of: July 1, 2021               Content Version: 13.2  © 2006-2022 Healthwise[employer]. Care instructions adapted under license by [employer] (which disclaims liability or warranty for this information). If you have questions about a medical condition or this instruction, always ask your healthcare professional. [address] any warranty or liability for your use of this information. Vertigo: Exercises  Introduction  Here are some examples of exercises for you to try.  The exercises may be suggested for a condition or for rehabilitation. Start each exercise slowly. Ease off the exercises if you start to have pain. You will be told when to start these exercises and which ones will work best for you. How to do the exercises  Exercise 1    12. Stand with a chair in front of you and a wall behind you. If you begin to fall, you may use them for support. 13. Stand with your feet together and your arms at your sides. 14. Move your head up and down 10 times. Exercise 2    11. Move your head side to side 10 times. Exercise 3    11. Move your head diagonally up and down 10 times. Exercise 4    10. Move your head diagonally up and down 10 times on the other side. Follow-up care is a key part of your treatment and safety. Be sure to make and go to all appointments, and call your doctor if you are having problems. It's also a good idea to know your test results and keep a list of the medicines you take. Where can you learn more? Go to http://www.gray.com/  Enter F349 in the search box to learn more about \"Vertigo: Exercises. \"  Current as of: September 8, 2021               Content Version: 13.2  © 9105-4605 Healthwise, [employer]. Care instructions adapted under license by [employer] (which disclaims liability or warranty for this information). If you have questions about a medical condition or this instruction, always ask your healthcare professional. [place] [address] any warranty or liability for your use of this information.

## 2022-05-07 LAB
BASOPHILS # BLD: 0 K/UL (ref 0–0.1)
BASOPHILS NFR BLD: 1 % (ref 0–1)
DIFFERENTIAL METHOD BLD: ABNORMAL
EOSINOPHIL # BLD: 0.1 K/UL (ref 0–0.4)
EOSINOPHIL NFR BLD: 1 % (ref 0–7)
ERYTHROCYTE [DISTWIDTH] IN BLOOD BY AUTOMATED COUNT: 15 % (ref 11.5–14.5)
EST. AVERAGE GLUCOSE BLD GHB EST-MCNC: 114 MG/DL
HBA1C MFR BLD: 5.6 % (ref 4–5.6)
HCT VFR BLD AUTO: 41.2 % (ref 35–47)
HGB BLD-MCNC: 13.1 G/DL (ref 11.5–16)
IMM GRANULOCYTES # BLD AUTO: 0 K/UL (ref 0–0.04)
IMM GRANULOCYTES NFR BLD AUTO: 0 % (ref 0–0.5)
LYMPHOCYTES # BLD: 1.2 K/UL (ref 0.8–3.5)
LYMPHOCYTES NFR BLD: 17 % (ref 12–49)
MCH RBC QN AUTO: 29.8 PG (ref 26–34)
MCHC RBC AUTO-ENTMCNC: 31.8 G/DL (ref 30–36.5)
MCV RBC AUTO: 93.6 FL (ref 80–99)
MONOCYTES # BLD: 0.6 K/UL (ref 0–1)
MONOCYTES NFR BLD: 8 % (ref 5–13)
NEUTS SEG # BLD: 5.2 K/UL (ref 1.8–8)
NEUTS SEG NFR BLD: 73 % (ref 32–75)
NRBC # BLD: 0 K/UL (ref 0–0.01)
NRBC BLD-RTO: 0 PER 100 WBC
PLATELET # BLD AUTO: 269 K/UL (ref 150–400)
PMV BLD AUTO: 9.1 FL (ref 8.9–12.9)
RBC # BLD AUTO: 4.4 M/UL (ref 3.8–5.2)
TSH SERPL DL<=0.05 MIU/L-ACNC: 1.19 UIU/ML (ref 0.36–3.74)
WBC # BLD AUTO: 7.2 K/UL (ref 3.6–11)

## 2022-06-10 ENCOUNTER — TELEPHONE (OUTPATIENT)
Dept: INTERNAL MEDICINE CLINIC | Age: 87
End: 2022-06-10

## 2022-06-10 DIAGNOSIS — M25.511 CHRONIC RIGHT SHOULDER PAIN: ICD-10-CM

## 2022-06-10 DIAGNOSIS — M70.61 TROCHANTERIC BURSITIS OF RIGHT HIP: Primary | ICD-10-CM

## 2022-06-10 DIAGNOSIS — G89.29 CHRONIC RIGHT SHOULDER PAIN: ICD-10-CM

## 2022-06-10 NOTE — TELEPHONE ENCOUNTER
----- Message from Lorna Cagle sent at 6/10/2022  3:08 PM EDT -----  Subject: Referral Request    QUESTIONS   Reason for referral request? Bill Brower 81 with [facility] called in   patient needs an updated physical therapy referral   Has the physician seen you for this condition before? Yes  Select a date? 2022-05-06  Select the Provider the patient wants to be referred to, if known (PCP or   Specialist)? Marcia Mandel III   Preferred Specialist (if applicable)? Do you already have an appointment scheduled? No  Additional Information for Provider? They would like a call back or this   faxed over when it is in [phone]   ---------------------------------------------------------------------------  --------------  [address]  What is the best way for the office to contact you? Do not leave any   message, patient will call back for answer  Preferred Call Back Phone Number? [phone]  ---------------------------------------------------------------------------  --------------  SCRIPT ANSWERS  Relationship to Patient? Third Party  Third Party Type? [address]? Representative Name?  Bill Brower 81 with [facility]kins

## 2022-06-13 ENCOUNTER — TELEPHONE (OUTPATIENT)
Dept: INTERNAL MEDICINE CLINIC | Age: 87
End: 2022-06-13

## 2022-06-20 NOTE — TELEPHONE ENCOUNTER
Faxed referral to physical therapy to [facility] at fax number [phone].

## 2022-07-07 ENCOUNTER — OFFICE VISIT (OUTPATIENT)
Dept: INTERNAL MEDICINE CLINIC | Age: 87
End: 2022-07-07
Payer: MEDICARE

## 2022-07-07 VITALS
DIASTOLIC BLOOD PRESSURE: 89 MMHG | HEART RATE: 83 BPM | TEMPERATURE: 97.9 F | WEIGHT: 160 LBS | OXYGEN SATURATION: 95 % | SYSTOLIC BLOOD PRESSURE: 129 MMHG | RESPIRATION RATE: 17 BRPM | BODY MASS INDEX: 25.71 KG/M2 | HEIGHT: 66 IN

## 2022-07-07 DIAGNOSIS — M70.61 TROCHANTERIC BURSITIS OF RIGHT HIP: Primary | ICD-10-CM

## 2022-07-07 DIAGNOSIS — I10 ESSENTIAL HYPERTENSION: ICD-10-CM

## 2022-07-07 DIAGNOSIS — F51.04 PSYCHOPHYSIOLOGICAL INSOMNIA: ICD-10-CM

## 2022-07-07 PROCEDURE — G8752 SYS BP LESS 140: HCPCS | Performed by: INTERNAL MEDICINE

## 2022-07-07 PROCEDURE — G8427 DOCREV CUR MEDS BY ELIG CLIN: HCPCS | Performed by: INTERNAL MEDICINE

## 2022-07-07 PROCEDURE — G8417 CALC BMI ABV UP PARAM F/U: HCPCS | Performed by: INTERNAL MEDICINE

## 2022-07-07 PROCEDURE — 99214 OFFICE O/P EST MOD 30 MIN: CPT | Performed by: INTERNAL MEDICINE

## 2022-07-07 PROCEDURE — G8754 DIAS BP LESS 90: HCPCS | Performed by: INTERNAL MEDICINE

## 2022-07-07 PROCEDURE — G0463 HOSPITAL OUTPT CLINIC VISIT: HCPCS | Performed by: INTERNAL MEDICINE

## 2022-07-07 PROCEDURE — 1090F PRES/ABSN URINE INCON ASSESS: CPT | Performed by: INTERNAL MEDICINE

## 2022-07-07 PROCEDURE — G8536 NO DOC ELDER MAL SCRN: HCPCS | Performed by: INTERNAL MEDICINE

## 2022-07-07 PROCEDURE — G8510 SCR DEP NEG, NO PLAN REQD: HCPCS | Performed by: INTERNAL MEDICINE

## 2022-07-07 PROCEDURE — 1101F PT FALLS ASSESS-DOCD LE1/YR: CPT | Performed by: INTERNAL MEDICINE

## 2022-07-07 NOTE — PROGRESS NOTES
HPI:  Fabiola Kanner is a 81y.o. year old female who is here for several issues. She is here to discuss ongoing issues with sleep. She previously tried trazodone without success. She was given a prescription for doxepin but never filled it due to concerns about its interaction with Paxil. She has been taking numerous over-the-counter agents with limited success. She has been taking them at midnight and does not go to sleep until 4 and then sleeps until 1 in the afternoon. She has been doing physical therapy for her hips and does think it is helpful. She did take 1 dose of diclofenac with misoprostol and developed some chest tightness and has not taken any since. No nausea or vomiting. No fevers or chills.       Past Medical History:   Diagnosis Date    Age-related osteoporosis without current pathological fracture 9/24/2021    Anxiety     Anxiety state, unspecified 5/3/2010    Atypical chest pain     Breast cancer metastasized to axillary lymph node ([address] 9/3/2014    Cancer ([address] 1998    endometrial cancer and breast    DJD (degenerative joint disease)     Endometrial cancer ([address] 3/20/2014    GERD (gastroesophageal reflux disease)     Hypertension     Nausea & vomiting     OA (osteoarthritis) 5/3/2010    Osteoarthritis of right hip 9/20/2012    PUD (peptic ulcer disease)     BLEEDING AFTER THR    Stenosis, spinal, lumbar        Past Surgical History:   Procedure Laterality Date    HX BLADDER SUSPENSION      HX CATARACT REMOVAL Bilateral     HX DILATION AND CURETTAGE  1998    HX GYN  2003    PELVIC FLOOR RECONSTRUCTION    HX HIP REPLACEMENT  2013    Right    HX ORTHOPAEDIC Right 2012    THR    HX OTHER SURGICAL  1999    A&P REPAIR    HX SHOULDER REPLACEMENT  2/2010    Left shoulder    HX MAXIMO AND BSO  1998    HX WISDOM TEETH EXTRACTION      MD BREAST SURGERY PROCEDURE UNLISTED      lumpectomy 05/22/14    VASCULAR SURGERY PROCEDURE UNLIST  1966    vein stripping left leg       Prior to Admission medications    Medication Sig Start Date End Date Taking? Authorizing Provider   PARoxetine (PaxiL) 20 mg tablet Take  by mouth daily. Yes Provider, Historical   diclofenac-miSOPROStol (ARTHROTEC 50)  mg-mcg per tablet Take 1 Tablet by mouth two (2) times daily as needed for Pain. 5/6/22  Yes David Lynn MD   doxepin (SINEquan) 10 mg capsule Take 1 Capsule by mouth nightly. 5/6/22  Yes David Lynn MD   denosumab (Prolia) 60 mg/mL injection 60 mg by SubCUTAneous route every 6 months. Yes Provider, Historical       Social History     Socioeconomic History    Marital status:      Spouse name: Not on file    Number of children: Not on file    Years of education: Not on file    Highest education level: Not on file   Occupational History    Not on file   Tobacco Use    Smoking status: Never Smoker    Smokeless tobacco: Never Used   Vaping Use    Vaping Use: Never used   Substance and Sexual Activity    Alcohol use: Not Currently     Alcohol/week: 0.8 - 1.7 standard drinks     Types: 1 - 2 Glasses of wine per week     Comment: \"VERY LITTLE\"    Drug use: No    Sexual activity: Yes     Partners: Male   Other Topics Concern    Not on file   Social History Narrative    Not on file     Social Determinants of Health     Financial Resource Strain:     Difficulty of Paying Living Expenses: Not on file   Food Insecurity:     Worried About Running Out of Food in the Last Year: Not on file    Deyanira of Food in the Last Year: Not on file   Transportation Needs:     Lack of Transportation (Medical): Not on file    Lack of Transportation (Non-Medical):  Not on file   Physical Activity:     Days of Exercise per Week: Not on file    Minutes of Exercise per Session: Not on file   Stress:     Feeling of Stress : Not on file   Social Connections:     Frequency of Communication with Friends and Family: Not on file    Frequency of Social Gatherings with Friends and Family: Not on file    Attends Restorationist Services: Not on file    Active Member of Clubs or Organizations: Not on file    Attends Club or Organization Meetings: Not on file    Marital Status: Not on file   Intimate Partner Violence:     Fear of Current or Ex-Partner: Not on file    Emotionally Abused: Not on file    Physically Abused: Not on file    Sexually Abused: Not on file   Housing Stability:     Unable to Pay for Housing in the Last Year: Not on file    Number of [place] in the Last Year: Not on file    Unstable Housing in the Last Year: Not on file          ROS  Per HPI    Visit Vitals  /89 (BP 1 Location: Right arm, BP Patient Position: Sitting, BP Cuff Size: Adult)   Pulse 83   Temp 97.9 °F (36.6 °C) (Temporal)   Resp 17   Ht 5' 6\" (1.676 m)   Wt 160 lb (72.6 kg)   SpO2 95%   BMI 25.82 kg/m²         Physical Exam   Physical Examination: General appearance - alert, well appearing, and in no distress  Chest - clear to auscultation, no wheezes, rales or rhonchi, symmetric air entry  Heart - normal rate, regular rhythm, normal S1, S2, no murmurs, rubs, clicks or gallops      Assessment/Plan:  Diagnoses and all orders for this visit:    1. Trochanteric bursitis of right hip-we will try Tylenol and continue physical therapy. Consider injections if symptoms progress. 2. Essential hypertension-blood pressure well controlled on current meds. 3. Psychophysiological insomnia-explained to her today that she is concerned about my prescription and its drug interactions although quite safe, but she is taking multiple over-the-counter agents without vetting those. She agreed to try low-dose doxepin at 1 tablet at night for the next couple nights then increasing to 2. If not successful, her daughter will let me know and we can try alternatives including drugs like Belsomra.               Advised her to call back or return to office if symptoms worsen/change/persist.  Discussed expected course/resolution/complications of diagnosis in detail with patient. Medication risks/benefits/costs/interactions/alternatives discussed with patient. She was given an after visit summary which includes diagnoses, current medications, & vitals. She expressed understanding with the diagnosis and plan.

## 2022-07-14 ENCOUNTER — APPOINTMENT (OUTPATIENT)
Dept: CT IMAGING | Age: 87
End: 2022-07-14
Attending: PHYSICIAN ASSISTANT
Payer: MEDICARE

## 2022-07-14 ENCOUNTER — HOSPITAL ENCOUNTER (EMERGENCY)
Age: 87
Discharge: HOME | End: 2022-07-15
Attending: EMERGENCY MEDICINE
Payer: MEDICARE

## 2022-07-14 DIAGNOSIS — K80.20 CALCULUS OF GALLBLADDER WITHOUT CHOLECYSTITIS WITHOUT OBSTRUCTION: ICD-10-CM

## 2022-07-14 DIAGNOSIS — K57.92 DIVERTICULITIS: Primary | ICD-10-CM

## 2022-07-14 DIAGNOSIS — K44.9 HIATAL HERNIA: ICD-10-CM

## 2022-07-14 LAB
ALBUMIN SERPL-MCNC: 4 G/DL (ref 3.5–5)
ALBUMIN/GLOB SERPL: 1.1 {RATIO} (ref 1.1–2.2)
ALP SERPL-CCNC: 66 U/L (ref 45–117)
ALT SERPL-CCNC: 16 U/L (ref 12–78)
ANION GAP SERPL CALC-SCNC: 6 MMOL/L (ref 5–15)
APPEARANCE UR: CLEAR
AST SERPL-CCNC: 15 U/L (ref 15–37)
BACTERIA URNS QL MICRO: NEGATIVE /HPF
BASOPHILS # BLD: 0 K/UL (ref 0–0.1)
BASOPHILS NFR BLD: 1 % (ref 0–1)
BILIRUB SERPL-MCNC: 0.5 MG/DL (ref 0.2–1)
BILIRUB UR QL: NEGATIVE
BUN SERPL-MCNC: 11 MG/DL (ref 6–20)
BUN/CREAT SERPL: 14 (ref 12–20)
CALCIUM SERPL-MCNC: 9.2 MG/DL (ref 8.5–10.1)
CHLORIDE SERPL-SCNC: 103 MMOL/L (ref 97–108)
CO2 SERPL-SCNC: 26 MMOL/L (ref 21–32)
COLOR UR: ABNORMAL
COMMENT, HOLDF: NORMAL
COMMENT, HOLDF: NORMAL
CREAT SERPL-MCNC: 0.8 MG/DL (ref 0.55–1.02)
DIFFERENTIAL METHOD BLD: ABNORMAL
EOSINOPHIL # BLD: 0 K/UL (ref 0–0.4)
EOSINOPHIL NFR BLD: 1 % (ref 0–7)
EPITH CASTS URNS QL MICRO: ABNORMAL /LPF
ERYTHROCYTE [DISTWIDTH] IN BLOOD BY AUTOMATED COUNT: 14.6 % (ref 11.5–14.5)
GLOBULIN SER CALC-MCNC: 3.7 G/DL (ref 2–4)
GLUCOSE SERPL-MCNC: 127 MG/DL (ref 65–100)
GLUCOSE UR STRIP.AUTO-MCNC: NEGATIVE MG/DL
HCT VFR BLD AUTO: 38.7 % (ref 35–47)
HGB BLD-MCNC: 13.1 G/DL (ref 11.5–16)
HGB UR QL STRIP: ABNORMAL
HYALINE CASTS URNS QL MICRO: ABNORMAL /LPF (ref 0–5)
IMM GRANULOCYTES # BLD AUTO: 0.1 K/UL (ref 0–0.04)
IMM GRANULOCYTES NFR BLD AUTO: 1 % (ref 0–0.5)
KETONES UR QL STRIP.AUTO: NEGATIVE MG/DL
LEUKOCYTE ESTERASE UR QL STRIP.AUTO: NEGATIVE
LIPASE SERPL-CCNC: 289 U/L (ref 73–393)
LYMPHOCYTES # BLD: 0.9 K/UL (ref 0.8–3.5)
LYMPHOCYTES NFR BLD: 16 % (ref 12–49)
MCH RBC QN AUTO: 29.9 PG (ref 26–34)
MCHC RBC AUTO-ENTMCNC: 33.9 G/DL (ref 30–36.5)
MCV RBC AUTO: 88.4 FL (ref 80–99)
MONOCYTES # BLD: 0.4 K/UL (ref 0–1)
MONOCYTES NFR BLD: 7 % (ref 5–13)
NEUTS SEG # BLD: 4.2 K/UL (ref 1.8–8)
NEUTS SEG NFR BLD: 74 % (ref 32–75)
NITRITE UR QL STRIP.AUTO: NEGATIVE
NRBC # BLD: 0 K/UL (ref 0–0.01)
NRBC BLD-RTO: 0 PER 100 WBC
PH UR STRIP: 7 [PH] (ref 5–8)
PLATELET # BLD AUTO: 221 K/UL (ref 150–400)
PMV BLD AUTO: 8.3 FL (ref 8.9–12.9)
POTASSIUM SERPL-SCNC: 4.1 MMOL/L (ref 3.5–5.1)
PROT SERPL-MCNC: 7.7 G/DL (ref 6.4–8.2)
PROT UR STRIP-MCNC: ABNORMAL MG/DL
RBC # BLD AUTO: 4.38 M/UL (ref 3.8–5.2)
RBC #/AREA URNS HPF: ABNORMAL /HPF (ref 0–5)
SAMPLES BEING HELD,HOLD: NORMAL
SAMPLES BEING HELD,HOLD: NORMAL
SODIUM SERPL-SCNC: 135 MMOL/L (ref 136–145)
SP GR UR REFRACTOMETRY: 1.01 (ref 1–1.03)
UR CULT HOLD, URHOLD: NORMAL
UROBILINOGEN UR QL STRIP.AUTO: 1 EU/DL (ref 0.2–1)
WBC # BLD AUTO: 5.6 K/UL (ref 3.6–11)
WBC URNS QL MICRO: ABNORMAL /HPF (ref 0–4)

## 2022-07-14 PROCEDURE — 81001 URINALYSIS AUTO W/SCOPE: CPT

## 2022-07-14 PROCEDURE — 74177 CT ABD & PELVIS W/CONTRAST: CPT

## 2022-07-14 PROCEDURE — 36415 COLL VENOUS BLD VENIPUNCTURE: CPT

## 2022-07-14 PROCEDURE — 85025 COMPLETE CBC W/AUTO DIFF WBC: CPT

## 2022-07-14 PROCEDURE — 80053 COMPREHEN METABOLIC PANEL: CPT

## 2022-07-14 PROCEDURE — 74011250637 HC RX REV CODE- 250/637: Performed by: PHYSICIAN ASSISTANT

## 2022-07-14 PROCEDURE — 83690 ASSAY OF LIPASE: CPT

## 2022-07-14 PROCEDURE — 99285 EMERGENCY DEPT VISIT HI MDM: CPT

## 2022-07-14 PROCEDURE — 74011000636 HC RX REV CODE- 636: Performed by: RADIOLOGY

## 2022-07-14 RX ORDER — METRONIDAZOLE 250 MG/1
500 TABLET ORAL
Status: COMPLETED | OUTPATIENT
Start: 2022-07-14 | End: 2022-07-14

## 2022-07-14 RX ADMIN — METRONIDAZOLE 500 MG: 250 TABLET ORAL at 23:54

## 2022-07-14 RX ADMIN — IOPAMIDOL 100 ML: 755 INJECTION, SOLUTION INTRAVENOUS at 22:25

## 2022-07-15 VITALS
WEIGHT: 175 LBS | OXYGEN SATURATION: 99 % | RESPIRATION RATE: 16 BRPM | DIASTOLIC BLOOD PRESSURE: 91 MMHG | HEIGHT: 66 IN | SYSTOLIC BLOOD PRESSURE: 145 MMHG | HEART RATE: 91 BPM | BODY MASS INDEX: 28.12 KG/M2 | TEMPERATURE: 98.1 F

## 2022-07-15 RX ORDER — METRONIDAZOLE 500 MG/1
500 TABLET ORAL 2 TIMES DAILY
Qty: 14 TABLET | Refills: 0 | Status: SHIPPED | OUTPATIENT
Start: 2022-07-15 | End: 2022-07-22

## 2022-07-15 NOTE — DISCHARGE INSTRUCTIONS
Return for new or worsening symptoms as discussed such as fever, severe pain, persistent vomiting, bloody stools. Take antibiotic with food. Your lab work and urinalysis tonight were reassuring. Your CT scan showed 3 things-diverticulitis which very well could have been the source of your left sided abdominal pain, a stone in the gallbladder which based on your symptoms would be less likely to be relevant this evening, and the large hiatal hernia-the hiatal hernia could be contributing to the symptoms that you have been having recently, it is unclear if that or the diverticulitis was more of a contributing factor this evening. It is important that you follow-up with primary care and also GI for all of these findings.

## 2022-07-15 NOTE — ED PROVIDER NOTES
81year old F presenting to the ED for abdominal pain. Pt note that she ate dinner and didn't fell well. Didn't feel her normal self earlier. About USP through her dinner this evening at about 6:45/7PM started with a pain in the LLQ of her abdomen - pain was constant, increased - went back to her room at [address] [place] [address], had nausea - dry heaved - then vomited on the way to the ER in the ambulance. Pain is currently \"better and I am thrilled. \"  No nausea now. Also passed a \"loose, long stool. \"  No melena/hematocheiza. No fever. No UTI symptoms - notes that she has frequency at baseline. PSx: list UTD per patient  Social: non-smoker. Very rare alcohol. 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              Past Medical History:   Diagnosis Date    Age-related osteoporosis without current pathological fracture 9/24/2021    Anxiety     Anxiety state, unspecified 5/3/2010    Atypical chest pain     Breast cancer metastasized to axillary lymph node ([address] 9/3/2014    Cancer ([address] 1998    endometrial cancer and breast    DJD (degenerative joint disease)     Endometrial cancer ([address] 3/20/2014    GERD (gastroesophageal reflux disease)     Hypertension     Nausea & vomiting     OA (osteoarthritis) 5/3/2010    Osteoarthritis of right hip 9/20/2012    PUD (peptic ulcer disease)     BLEEDING AFTER THR    Stenosis, spinal, lumbar        Past Surgical History:   Procedure Laterality Date    HX BLADDER SUSPENSION      HX CATARACT REMOVAL Bilateral     HX DILATION AND CURETTAGE  1998    HX GYN  2003    PELVIC FLOOR RECONSTRUCTION    HX HIP REPLACEMENT  2013    Right    HX ORTHOPAEDIC Right 2012    THR    HX OTHER SURGICAL  1999    A&P REPAIR    HX SHOULDER REPLACEMENT  2/2010    Left shoulder    HX MAXIMO AND BSO  1998    HX WISDOM TEETH EXTRACTION      IN BREAST SURGERY PROCEDURE UNLISTED      lumpectomy 05/22/14    VASCULAR SURGERY PROCEDURE UNLIST  1966    vein stripping left leg         Family History:   Problem Relation Age of Onset    Stroke Mother     Lung Disease Mother     Heart Disease Father     Cancer Other         breast    Post-op Nausea/Vomiting Other     Anesth Problems Other         PONV    Other Grandchild         GENETIC DJD    No Known Problems Daughter        Social History     Socioeconomic History    Marital status:      Spouse name: Not on file    Number of children: Not on file    Years of education: Not on file    Highest education level: Not on file   Occupational History    Not on file   Tobacco Use    Smoking status: Never Smoker    Smokeless tobacco: Never Used   Vaping Use    Vaping Use: Never used   Substance and Sexual Activity    Alcohol use: Not Currently     Alcohol/week: 0.8 - 1.7 standard drinks     Types: 1 - 2 Glasses of wine per week     Comment: \"VERY LITTLE\"    Drug use: No    Sexual activity: Yes     Partners: Male   Other Topics Concern    Not on file   Social History Narrative    Not on file     Social Determinants of Health     Financial Resource Strain:     Difficulty of Paying Living Expenses: Not on file   Food Insecurity:     Worried About Running Out of Food in the Last Year: Not on file    Deyanira of Food in the Last Year: Not on file   Transportation Needs:     Lack of Transportation (Medical): Not on file    Lack of Transportation (Non-Medical):  Not on file   Physical Activity:     Days of Exercise per Week: Not on file    Minutes of Exercise per Session: Not on file   Stress:     Feeling of Stress : Not on file   Social Connections:     Frequency of Communication with Friends and Family: Not on file    Frequency of Social Gatherings with Friends and Family: Not on file    Attends Holiness Services: Not on file    Active Member of Clubs or Organizations: Not on file    Attends Club or Organization Meetings: Not on file    Marital Status: Not on file   Intimate Partner Violence:     Fear of Current or Ex-Partner: Not on file   Jennifer Emotionally Abused: Not on file    Physically Abused: Not on file    Sexually Abused: Not on file   Housing Stability:     Unable to Pay for Housing in the Last Year: Not on file    Number of Places Lived in the Last Year: Not on file    Unstable Housing in the Last Year: Not on file         ALLERGIES: Oxycontin [oxycodone], Celebrex [celecoxib], Ciprofloxacin, E-mycin [erythromycin], Keflex [cephalexin], Morphine, Pcn [penicillins], and Tetracycline    Review of Systems   Constitutional: Negative for fever. HENT: Negative for facial swelling. Respiratory: Negative for shortness of breath. Cardiovascular: Negative for chest pain. Gastrointestinal: Positive for abdominal pain, nausea and vomiting. Genitourinary: Negative for dysuria. Musculoskeletal: Negative for neck stiffness. Skin: Negative for wound. Neurological: Negative for syncope. All other systems reviewed and are negative. Vitals:    07/14/22 2103 07/15/22 0018   BP: (!) 156/110 (!) 145/91   Pulse: 94 91   Resp: 16 16   Temp: 98.4 °F (36.9 °C) 98.1 °F (36.7 °C)   SpO2: 99% 99%   Weight: 79.4 kg (175 lb)    Height: 5' 6\" (1.676 m)             Physical Exam  Vitals and nursing note reviewed. Constitutional:       General: She is not in acute distress. Appearance: She is well-developed. Comments: Pleasant, smiling, talkative, well-appearing   HENT:      Head: Normocephalic and atraumatic. Right Ear: External ear normal.      Left Ear: External ear normal.   Eyes:      General: No scleral icterus. Conjunctiva/sclera: Conjunctivae normal.   Neck:      Trachea: No tracheal deviation. Cardiovascular:      Rate and Rhythm: Normal rate and regular rhythm. Heart sounds: Normal heart sounds. No murmur heard. No friction rub. No gallop. Pulmonary:      Effort: Pulmonary effort is normal. No respiratory distress. Breath sounds: Normal breath sounds. No stridor. No wheezing.    Abdominal:      General: There is no distension. Palpations: Abdomen is soft. Tenderness: There is no abdominal tenderness. Musculoskeletal:         General: Normal range of motion. Cervical back: Neck supple. Skin:     General: Skin is warm and dry. Neurological:      Mental Status: She is alert and oriented to person, place, and time. Psychiatric:         Behavior: Behavior normal.          MDM  Number of Diagnoses or Management Options  Calculus of gallbladder without cholecystitis without obstruction  Diverticulitis  Hiatal hernia  Diagnosis management comments: 41-year-old female presenting to the ED for episode of left-sided abdominal pain that started while eating dinner, escalated, had nausea/vomiting. Symptoms resolved by the time she was evaluated in the ED. Abdomen benign. Labs/UA reassuring - CT with multiple findings - discussed with patient that diverticulitis was most likely cause of LLQ pain, but discussed that hiatal hernia could have contributed as well -discussed gallstone, did not feel at this time that it was likely a contributing factor this evening. Discussed with patient reasons that she needs to follow-up with her PCP and also GI, specific return precautions given. Patient with many, many antibiotic allergies, will treat with Flagyl as monotherapy as she is unable to tolerate Augmentin or fluoroquinolones.        Amount and/or Complexity of Data Reviewed  Clinical lab tests: ordered and reviewed  Tests in the radiology section of CPT®: ordered and reviewed  Discuss the patient with other providers: yes (Dr. Jane Maradiaga ED attending)           Procedures

## 2022-07-19 ENCOUNTER — TELEPHONE (OUTPATIENT)
Dept: INTERNAL MEDICINE CLINIC | Age: 87
End: 2022-07-19

## 2022-08-14 ENCOUNTER — HOSPITAL ENCOUNTER (EMERGENCY)
Age: 87
Discharge: HOME | End: 2022-08-14
Attending: EMERGENCY MEDICINE | Admitting: EMERGENCY MEDICINE
Payer: MEDICARE

## 2022-08-14 ENCOUNTER — APPOINTMENT (OUTPATIENT)
Dept: GENERAL RADIOLOGY | Age: 87
End: 2022-08-14
Attending: EMERGENCY MEDICINE
Payer: MEDICARE

## 2022-08-14 ENCOUNTER — APPOINTMENT (OUTPATIENT)
Dept: CT IMAGING | Age: 87
End: 2022-08-14
Attending: EMERGENCY MEDICINE
Payer: MEDICARE

## 2022-08-14 VITALS
DIASTOLIC BLOOD PRESSURE: 92 MMHG | HEART RATE: 88 BPM | OXYGEN SATURATION: 98 % | HEIGHT: 66 IN | BODY MASS INDEX: 26.15 KG/M2 | SYSTOLIC BLOOD PRESSURE: 135 MMHG | TEMPERATURE: 98.4 F | WEIGHT: 162.7 LBS | RESPIRATION RATE: 16 BRPM

## 2022-08-14 DIAGNOSIS — S00.81XA FACIAL ABRASION, INITIAL ENCOUNTER: Primary | ICD-10-CM

## 2022-08-14 PROCEDURE — 73562 X-RAY EXAM OF KNEE 3: CPT

## 2022-08-14 PROCEDURE — 70450 CT HEAD/BRAIN W/O DYE: CPT

## 2022-08-14 PROCEDURE — 72125 CT NECK SPINE W/O DYE: CPT

## 2022-08-14 PROCEDURE — 70486 CT MAXILLOFACIAL W/O DYE: CPT

## 2022-08-14 PROCEDURE — 74011250637 HC RX REV CODE- 250/637: Performed by: EMERGENCY MEDICINE

## 2022-08-14 PROCEDURE — 99284 EMERGENCY DEPT VISIT MOD MDM: CPT

## 2022-08-14 RX ORDER — HYDROCODONE BITARTRATE AND ACETAMINOPHEN 5; 325 MG/1; MG/1
1 TABLET ORAL ONCE
Status: COMPLETED | OUTPATIENT
Start: 2022-08-14 | End: 2022-08-14

## 2022-08-14 RX ORDER — ESCITALOPRAM OXALATE 10 MG/1
10 TABLET ORAL DAILY
COMMUNITY

## 2022-08-14 RX ADMIN — HYDROCODONE BITARTRATE AND ACETAMINOPHEN 1 TABLET: 5; 325 TABLET ORAL at 15:54

## 2022-08-14 NOTE — ED TRIAGE NOTES
Triage Note: Patient [place] mall for [place] and fall. Patient reported to EMS that she tripped on a parking marker landing on her knees and hitting her face. Small abrasions noted to the forehead and nose. Denies LOC. Daughter at bedside. Complains of lightheadedness after falling.

## 2022-08-14 NOTE — ED PROVIDER NOTES
23-year-old female presents with a chief complaint of fall. Patient tripped and fell over a piece of concrete [place] a local mall. She landed face first.  She did strike her bilateral knees and abraded her forehead. She denies loss of consciousness. She does endorse neck pain but denies back pain, chest pain, abdominal pain, numbness, tingling or weakness.        Past Medical History:   Diagnosis Date    Age-related osteoporosis without current pathological fracture 9/24/2021    Anxiety     Anxiety state, unspecified 5/3/2010    Atypical chest pain     Breast cancer metastasized to axillary lymph node ([address] 9/3/2014    Cancer ([address] 1998    endometrial cancer and breast    DJD (degenerative joint disease)     Endometrial cancer [facility] 3/20/2014    GERD (gastroesophageal reflux disease)     Hypertension     Nausea & vomiting     OA (osteoarthritis) 5/3/2010    Osteoarthritis of right hip 9/20/2012    PUD (peptic ulcer disease)     BLEEDING AFTER THR    Stenosis, spinal, lumbar        Past Surgical History:   Procedure Laterality Date    HX BLADDER SUSPENSION      HX CATARACT REMOVAL Bilateral     HX DILATION AND CURETTAGE  1998    HX GYN  2003    PELVIC FLOOR RECONSTRUCTION    HX HIP REPLACEMENT  2013    Right    HX ORTHOPAEDIC Right 2012    THR    HX OTHER SURGICAL  1999    A&P REPAIR    HX SHOULDER REPLACEMENT  2/2010    Left shoulder    HX MAXIMO AND BSO  1998    HX WISDOM TEETH EXTRACTION      [facility] BREAST SURGERY PROCEDURE UNLISTED      lumpectomy 05/22/14    VASCULAR SURGERY PROCEDURE UNLIST  1966    vein stripping left leg         Family History:   Problem Relation Age of Onset    Stroke Mother     Lung Disease Mother     Heart Disease Father     Cancer Other         breast    Post-op Nausea/Vomiting Other     Anesth Problems Other         PONV    Other Grandchild         GENETIC DJD    No Known Problems Daughter        Social History     Socioeconomic History    Marital status:      Spouse name: Not on file Number of children: Not on file    Years of education: Not on file    Highest education level: Not on file   Occupational History    Not on file   Tobacco Use    Smoking status: Never    Smokeless tobacco: Never   Vaping Use    Vaping Use: Never used   Substance and Sexual Activity    Alcohol use: Not Currently     Alcohol/week: 0.8 - 1.7 standard drinks     Types: 1 - 2 Glasses of wine per week     Comment: \"VERY LITTLE\"    Drug use: No    Sexual activity: Yes     Partners: Male   Other Topics Concern    Not on file   Social History Narrative    Not on file     Social Determinants of Health     Financial Resource Strain: Not on file   Food Insecurity: Not on file   Transportation Needs: Not on file   Physical Activity: Not on file   Stress: Not on file   Social Connections: Not on file   Intimate Partner Violence: Not on file   Housing Stability: Not on file         ALLERGIES: Oxycontin [oxycodone], Celebrex [celecoxib], Ciprofloxacin, E-mycin [erythromycin], Keflex [cephalexin], Morphine, Pcn [penicillins], and Tetracycline    Review of Systems   Constitutional:  Negative for fever. HENT:  Negative for rhinorrhea. Respiratory:  Negative for shortness of breath. Cardiovascular:  Negative for chest pain. Gastrointestinal:  Negative for abdominal pain. Genitourinary:  Negative for dysuria. Musculoskeletal:  Negative for back pain. Skin:  Positive for wound. Neurological:  Negative for headaches. Psychiatric/Behavioral:  Negative for confusion. There were no vitals filed for this visit. Physical Exam  Vitals and nursing note reviewed. Constitutional:       General: She is not in acute distress. Appearance: Normal appearance. She is not ill-appearing, toxic-appearing or diaphoretic. HENT:      Head: Normocephalic. Comments: Forehead and nasal bridge abrasion. Eyes:      Extraocular Movements: Extraocular movements intact.       Comments: Developing periorbital ecchymosis Cardiovascular:      Rate and Rhythm: Normal rate. Pulses: Normal pulses. Pulmonary:      Effort: Pulmonary effort is normal. No respiratory distress. Abdominal:      General: There is no distension. Musculoskeletal:         General: Normal range of motion. Cervical back: Normal range of motion. Skin:     General: Skin is dry. Neurological:      Mental Status: She is alert and oriented to person, place, and time. Psychiatric:         Mood and Affect: Mood normal.        MDM  Number of Diagnoses or Management Options  Facial abrasion, initial encounter  Diagnosis management comments:       Plain film and CT imaging obtained to rule out traumatic injury. Imaging unremarkable. Discussed my clinical impression(s), any labs and/or radiology results with the patient. I answered any questions and addressed any concerns. Discussed the importance of following up with their primary care physician and/or specialist(s). Discussed signs or symptoms that would warrant return back to the ER for further evaluation. The patient is agreeable with discharge.            Procedures

## 2022-08-14 NOTE — DISCHARGE INSTRUCTIONS
Thank you for allowing us to provide you with medical care today. We realize that you have many choices for your emergency care needs. We thank you for choosing [facility]. Please choose us in the future for any continued health care needs. The exam and treatment you received in the Emergency Department were for an emergent problem and are not intended as complete care. It is important that you follow up with a doctor, nurse practitioner, or physician's assistant for ongoing care. If your symptoms worsen or you do not improve as expected and you are unable to reach your usual health care provider, you should return to the Emergency Department. We are available 24 hours a day. Please make an appointment with your health care provider(s) for follow up of your Emergency Department visit. Take this sheet with you when you go to your follow-up visit.

## 2022-08-14 NOTE — ED NOTES
Patient given discharge papers and instructions by this RN. Patient verbalized understanding and stated not having questions or concerns regarding her care. Patient wheeled out of ED by this RN in no new acute distress.

## 2022-10-07 ENCOUNTER — HOSPITAL ENCOUNTER (OUTPATIENT)
Dept: INFUSION THERAPY | Age: 87
Discharge: HOME | End: 2022-10-07
Payer: MEDICARE

## 2022-10-07 ENCOUNTER — TELEPHONE (OUTPATIENT)
Dept: INTERNAL MEDICINE CLINIC | Age: 87
End: 2022-10-07

## 2022-10-07 VITALS
RESPIRATION RATE: 18 BRPM | OXYGEN SATURATION: 97 % | HEART RATE: 83 BPM | TEMPERATURE: 98.8 F | DIASTOLIC BLOOD PRESSURE: 77 MMHG | SYSTOLIC BLOOD PRESSURE: 115 MMHG

## 2022-10-07 DIAGNOSIS — M81.0 AGE-RELATED OSTEOPOROSIS WITHOUT CURRENT PATHOLOGICAL FRACTURE: ICD-10-CM

## 2022-10-07 DIAGNOSIS — M16.11 OSTEOARTHRITIS OF RIGHT HIP, UNSPECIFIED OSTEOARTHRITIS TYPE: ICD-10-CM

## 2022-10-07 PROCEDURE — 74011250636 HC RX REV CODE- 250/636: Performed by: INTERNAL MEDICINE

## 2022-10-07 PROCEDURE — 96372 THER/PROPH/DIAG INJ SC/IM: CPT

## 2022-10-07 RX ADMIN — DENOSUMAB 60 MG: 60 INJECTION SUBCUTANEOUS at 15:15

## 2022-10-07 NOTE — PROGRESS NOTES
[facility] Peds/Adult Note                       Date: 2022    Name: Paul Muñoz    MRN: [id number]         : 1935    1400 Patient arrives for Prolia without acute problems. Please see [facility] for complete assessment and education provided. Prior to treatment, patient was screened for COVID 19. Patient denies any signs or symptoms of COVID. Denies any known physical contact with anyone diagnosed with, or with pending or positive COVID test. Denies a pending or positive COVID test himself. Vital signs stable throughout and prior to discharge. Patient tolerated procedure well and was discharged without incident. Patient is aware of next [facility] appointment on  2023  Appointment card give to the Patient      Ms. Marinelli's vitals were reviewed prior to and after treatment. Patient Vitals for the past 12 hrs:   Temp Pulse Resp BP SpO2   10/07/22 1447 98.8 °F (37.1 °C) 83 18 115/77 97 %         Lab results were brought in by the patient and reviewed. No results found for this or any previous visit (from the past 12 hour(s)). Medications given:   Medications Administered       denosumab (PROLIA) injection 60 mg       Admin Date  10/07/2022 Action  Given Dose  60 mg Route  SubCUTAneous Administered By  Mahogany San RN                    Ms. Yanci Cordova tolerated the injection, and had no complaints. Ms. Yanci Cordova was discharged from [place] [address] in stable condition. Discharge Instructions provided to patient, patient verbalized understanding but denied the request for a copy of d/c instructions.      Future Appointments   Date Time Provider Zelda Hartley   2023  2:00 PM [address] [place] [facility] Nolan Garcia RN  2022  4:27 PM

## 2022-10-24 ENCOUNTER — APPOINTMENT (OUTPATIENT)
Dept: INFUSION THERAPY | Age: 87
End: 2022-10-24
Payer: MEDICARE

## 2023-01-17 ENCOUNTER — TELEPHONE (OUTPATIENT)
Dept: INTERNAL MEDICINE CLINIC | Age: 88
End: 2023-01-17

## 2023-01-17 NOTE — TELEPHONE ENCOUNTER
Requested Prescriptions     Pending Prescriptions Disp Refills    busPIRone (BUSPAR) 10 mg tablet 90 Tablet 1     Sig: Take 1 Tablet by mouth three (3) times daily as needed (anxiety).      Patient has appt 04/17/23

## 2023-01-17 NOTE — TELEPHONE ENCOUNTER
Called patient, verified IDx2. Patient states when she moved to [address] [place] [address] that she was told she would need to be followed by their MD (Dr. Kusum Sherman). Patient says she has not seen him and was unhappy with the fact no MD was on staff the week of Christmas. She wants Dr. Kevin Lawson to remain her PCP if possible? She is also asking if Dr. Kevin Lawson will refill her buspar - it was prescribed for prn use after her husbands death, she says she is having a hard time coping and needs a refill. Advised pt I would review w/ SRJ and return call.

## 2023-01-18 ENCOUNTER — TELEPHONE (OUTPATIENT)
Dept: INTERNAL MEDICINE CLINIC | Age: 88
End: 2023-01-18

## 2023-01-18 RX ORDER — BUSPIRONE HYDROCHLORIDE 10 MG/1
10 TABLET ORAL
Qty: 90 TABLET | Refills: 0 | Status: SHIPPED | OUTPATIENT
Start: 2023-01-18

## 2023-01-18 RX ORDER — TRAZODONE HYDROCHLORIDE 50 MG/1
12.5 TABLET ORAL AS NEEDED
COMMUNITY

## 2023-01-18 RX ORDER — FAMOTIDINE 20 MG/1
20 TABLET, FILM COATED ORAL 2 TIMES DAILY
COMMUNITY

## 2023-01-18 NOTE — TELEPHONE ENCOUNTER
Patient and daughter have both spoken and agreed to return to Dr. Islas President for PCP. Pt scheduled for re-establish appointment.   Future Appointments   Date Time Provider Zelda Hartley   2/2/2023  3:00 PM MD YOVANA Cooper AMB   4/7/2023  2:00 PM MARTI BREWER [facility]

## 2023-01-18 NOTE — TELEPHONE ENCOUNTER
Spoke with patient, verified IDx2. Scheduled patient for re-establish appointment 2/2/23 - pt instructed to bring all medication bottles (new & old) to appointment. Med rec updated on phone w/ pt. Buspar sent to [facility] on file per pt request.    Orders Placed This Encounter    busPIRone (BUSPAR) 10 mg tablet     Sig: Take 1 Tablet by mouth three (3) times daily as needed (anxiety). Dispense:  90 Tablet     Refill:  0     The above orders were approved via [facility] per Dr. Zeus Bowen, III.     Future Appointments   Date Time Provider Zelda Hartley   2/2/2023  3:00 PM MD YOVANA Yuan BS AMB   4/7/2023  2:00 PM A3 [facility]

## 2023-01-18 NOTE — TELEPHONE ENCOUNTER
Contacted patients daughter Angela Lombardi (on HIPAA) per [facility] request.  Angela Lombardi was unaware pt had called our office requesting to return as patient. Angela Lombardi states she will review w/ her mom and call back this afternoon to confirm if pt will stay with Dr. Ja Ortega or w/ [facility].

## 2023-01-18 NOTE — TELEPHONE ENCOUNTER
Reason for call:    Regla Kirkpatrick, patient's daughter called, returning Madelyn's phone call.     Is this a new problem: yes     Date of last appointment:  7/7/2022     Can we respond via oNoise: no    Best call back number:     Regla Kirkpatrick - [phone]

## 2023-01-19 LAB — HBA1C MFR BLD HPLC: 5.8 %

## 2023-02-02 ENCOUNTER — OFFICE VISIT (OUTPATIENT)
Dept: INTERNAL MEDICINE CLINIC | Age: 88
End: 2023-02-02
Payer: MEDICARE

## 2023-02-02 VITALS
HEIGHT: 66 IN | BODY MASS INDEX: 24.43 KG/M2 | WEIGHT: 152 LBS | TEMPERATURE: 98.4 F | SYSTOLIC BLOOD PRESSURE: 138 MMHG | RESPIRATION RATE: 18 BRPM | DIASTOLIC BLOOD PRESSURE: 90 MMHG

## 2023-02-02 DIAGNOSIS — K57.90 DIVERTICULOSIS: ICD-10-CM

## 2023-02-02 DIAGNOSIS — R73.9 HYPERGLYCEMIA: ICD-10-CM

## 2023-02-02 DIAGNOSIS — I10 ESSENTIAL HYPERTENSION: ICD-10-CM

## 2023-02-02 DIAGNOSIS — F41.1 ANXIETY STATE: Primary | ICD-10-CM

## 2023-02-02 DIAGNOSIS — C50.911 MALIGNANT NEOPLASM OF RIGHT FEMALE BREAST, UNSPECIFIED ESTROGEN RECEPTOR STATUS, UNSPECIFIED SITE OF BREAST (HCC): ICD-10-CM

## 2023-02-02 DIAGNOSIS — K44.9 HIATAL HERNIA: ICD-10-CM

## 2023-02-02 DIAGNOSIS — K59.00 CONSTIPATION, UNSPECIFIED CONSTIPATION TYPE: ICD-10-CM

## 2023-02-02 PROCEDURE — G0463 HOSPITAL OUTPT CLINIC VISIT: HCPCS | Performed by: INTERNAL MEDICINE

## 2023-02-02 RX ORDER — MULTIVITAMIN
1 TABLET ORAL DAILY
COMMUNITY
Start: 2023-02-02

## 2023-02-02 RX ORDER — PHENOL/SODIUM PHENOLATE
20 AEROSOL, SPRAY (ML) MUCOUS MEMBRANE DAILY
COMMUNITY

## 2023-02-02 RX ORDER — PSYLLIUM HUSK (WITH SUGAR) 3.4 G/7 G
2 POWDER (GRAM) ORAL DAILY
COMMUNITY
Start: 2023-02-02

## 2023-02-02 RX ORDER — ESCITALOPRAM OXALATE 20 MG/1
20 TABLET ORAL DAILY
Qty: 90 TABLET | Refills: 1 | Status: SHIPPED | OUTPATIENT
Start: 2023-02-02

## 2023-02-02 RX ORDER — ZALEPLON 5 MG/1
CAPSULE ORAL
COMMUNITY
End: 2023-02-02 | Stop reason: ALTCHOICE

## 2023-02-02 RX ORDER — LANOLIN ALCOHOL/MO/W.PET/CERES
400 CREAM (GRAM) TOPICAL
COMMUNITY
Start: 2023-02-02

## 2023-02-12 ENCOUNTER — HOSPITAL ENCOUNTER (EMERGENCY)
Age: 88
Discharge: HOME | End: 2023-02-12
Attending: EMERGENCY MEDICINE
Payer: MEDICARE

## 2023-02-12 VITALS
DIASTOLIC BLOOD PRESSURE: 96 MMHG | HEIGHT: 66 IN | RESPIRATION RATE: 16 BRPM | OXYGEN SATURATION: 97 % | SYSTOLIC BLOOD PRESSURE: 145 MMHG | HEART RATE: 96 BPM | TEMPERATURE: 98.2 F | WEIGHT: 162.48 LBS | BODY MASS INDEX: 26.11 KG/M2

## 2023-02-12 DIAGNOSIS — I10 HYPERTENSION, UNSPECIFIED TYPE: ICD-10-CM

## 2023-02-12 DIAGNOSIS — R42 ORTHOSTATIC LIGHTHEADEDNESS: ICD-10-CM

## 2023-02-12 DIAGNOSIS — A08.4 VIRAL GASTROENTERITIS: Primary | ICD-10-CM

## 2023-02-12 LAB
ALBUMIN SERPL-MCNC: 4.1 G/DL (ref 3.5–5)
ALBUMIN/GLOB SERPL: 1.2 (ref 1.1–2.2)
ALP SERPL-CCNC: 48 U/L (ref 45–117)
ALT SERPL-CCNC: 26 U/L (ref 12–78)
ANION GAP SERPL CALC-SCNC: 9 MMOL/L (ref 5–15)
AST SERPL-CCNC: 30 U/L (ref 15–37)
ATRIAL RATE: 87 BPM
BASOPHILS # BLD: 0 K/UL (ref 0–0.1)
BASOPHILS NFR BLD: 0 % (ref 0–1)
BILIRUB SERPL-MCNC: 0.7 MG/DL (ref 0.2–1)
BNP SERPL-MCNC: 140 PG/ML (ref 0–450)
BUN SERPL-MCNC: 13 MG/DL (ref 6–20)
BUN/CREAT SERPL: 14 (ref 12–20)
CALCIUM SERPL-MCNC: 9.1 MG/DL (ref 8.5–10.1)
CALCULATED P AXIS, ECG09: 30 DEGREES
CALCULATED R AXIS, ECG10: 49 DEGREES
CALCULATED T AXIS, ECG11: 20 DEGREES
CHLORIDE SERPL-SCNC: 99 MMOL/L (ref 97–108)
CO2 SERPL-SCNC: 26 MMOL/L (ref 21–32)
CREAT SERPL-MCNC: 0.92 MG/DL (ref 0.55–1.02)
DIAGNOSIS, 93000: NORMAL
DIFFERENTIAL METHOD BLD: ABNORMAL
EOSINOPHIL # BLD: 0 K/UL (ref 0–0.4)
EOSINOPHIL NFR BLD: 0 % (ref 0–7)
ERYTHROCYTE [DISTWIDTH] IN BLOOD BY AUTOMATED COUNT: 14.2 % (ref 11.5–14.5)
GLOBULIN SER CALC-MCNC: 3.3 G/DL (ref 2–4)
GLUCOSE SERPL-MCNC: 109 MG/DL (ref 65–100)
HCT VFR BLD AUTO: 38.2 % (ref 35–47)
HGB BLD-MCNC: 12.7 G/DL (ref 11.5–16)
IMM GRANULOCYTES # BLD AUTO: 0.1 K/UL (ref 0–0.04)
IMM GRANULOCYTES NFR BLD AUTO: 1 % (ref 0–0.5)
LIPASE SERPL-CCNC: 134 U/L (ref 73–393)
LYMPHOCYTES # BLD: 0.6 K/UL (ref 0.8–3.5)
LYMPHOCYTES NFR BLD: 9 % (ref 12–49)
MAGNESIUM SERPL-MCNC: 1.9 MG/DL (ref 1.6–2.4)
MCH RBC QN AUTO: 29 PG (ref 26–34)
MCHC RBC AUTO-ENTMCNC: 33.2 G/DL (ref 30–36.5)
MCV RBC AUTO: 87.2 FL (ref 80–99)
MONOCYTES # BLD: 0.5 K/UL (ref 0–1)
MONOCYTES NFR BLD: 7 % (ref 5–13)
NEUTS SEG # BLD: 5.5 K/UL (ref 1.8–8)
NEUTS SEG NFR BLD: 83 % (ref 32–75)
NRBC # BLD: 0 K/UL (ref 0–0.01)
NRBC BLD-RTO: 0 PER 100 WBC
P-R INTERVAL, ECG05: 136 MS
PLATELET # BLD AUTO: 236 K/UL (ref 150–400)
PMV BLD AUTO: 8.4 FL (ref 8.9–12.9)
POTASSIUM SERPL-SCNC: 3.9 MMOL/L (ref 3.5–5.1)
PROT SERPL-MCNC: 7.4 G/DL (ref 6.4–8.2)
Q-T INTERVAL, ECG07: 368 MS
QRS DURATION, ECG06: 82 MS
QTC CALCULATION (BEZET), ECG08: 442 MS
RBC # BLD AUTO: 4.38 M/UL (ref 3.8–5.2)
RBC MORPH BLD: ABNORMAL
SODIUM SERPL-SCNC: 134 MMOL/L (ref 136–145)
TROPONIN I SERPL HS-MCNC: 5 NG/L (ref 0–51)
VENTRICULAR RATE, ECG03: 87 BPM
WBC # BLD AUTO: 6.7 K/UL (ref 3.6–11)

## 2023-02-12 PROCEDURE — 83690 ASSAY OF LIPASE: CPT

## 2023-02-12 PROCEDURE — 93005 ELECTROCARDIOGRAM TRACING: CPT

## 2023-02-12 PROCEDURE — 36415 COLL VENOUS BLD VENIPUNCTURE: CPT

## 2023-02-12 PROCEDURE — 83880 ASSAY OF NATRIURETIC PEPTIDE: CPT

## 2023-02-12 PROCEDURE — 99284 EMERGENCY DEPT VISIT MOD MDM: CPT

## 2023-02-12 PROCEDURE — 74011250636 HC RX REV CODE- 250/636: Performed by: EMERGENCY MEDICINE

## 2023-02-12 PROCEDURE — 84484 ASSAY OF TROPONIN QUANT: CPT

## 2023-02-12 PROCEDURE — 96360 HYDRATION IV INFUSION INIT: CPT

## 2023-02-12 PROCEDURE — 83735 ASSAY OF MAGNESIUM: CPT

## 2023-02-12 PROCEDURE — 85025 COMPLETE CBC W/AUTO DIFF WBC: CPT

## 2023-02-12 PROCEDURE — 80053 COMPREHEN METABOLIC PANEL: CPT

## 2023-02-12 RX ORDER — ONDANSETRON 4 MG/1
4 TABLET, ORALLY DISINTEGRATING ORAL
Qty: 20 TABLET | Refills: 0 | Status: SHIPPED | OUTPATIENT
Start: 2023-02-12

## 2023-02-12 RX ADMIN — SODIUM CHLORIDE 1000 ML: 9 INJECTION, SOLUTION INTRAVENOUS at 15:36

## 2023-02-12 NOTE — ED TRIAGE NOTES
Presented to ED via family with High blood pressure and posterior head pain. Denies trauma or injury. Dizzy began after starting Lexapro for anxiety. Dr. Jyoti Manriquez wanted to watch BP prior prescribing BP medications.

## 2023-02-12 NOTE — ED PROVIDER NOTES
79-year-old female with history as below presents to the emergency department noting a 4-day history of diarrhea, nausea, vomiting, intermittent headaches and fatigue as well as lightheadedness sensations particularly with orthostatic position changes. She states that at the beginning of her illness she had some nasal congestion and runny nose and mild cough but states that these symptoms have improved. She denies any syncope, numbness, weakness, but states that when she gets up from sitting position she has a lightheadedness sensation that usually lasts for about 30 seconds and then resolves. She states that she also recently started Lexapro for anxiety. She also has been following with her primary care doctor keeping an eye on her blood pressure as they are discussing the possible need to start her on medications although she is not currently on blood pressure medications at this time. She denies any headache currently and denies any fall or head injury, no abdominal pain, cough or URI symptoms, chest pain, shortness of breath, fever, dysuria, hematuria, or any other medical concerns. Hypertension   Associated symptoms include headaches, nausea and vomiting. Pertinent negatives include no chest pain, no neck pain, no dizziness and no shortness of breath. Headache   Associated symptoms include nausea and vomiting. Pertinent negatives include no fever, no shortness of breath, no weakness and no dizziness. Dizziness  Associated symptoms include vomiting, headaches and nausea. Pertinent negatives include no shortness of breath and no chest pain.       Past Medical History:   Diagnosis Date    Age-related osteoporosis without current pathological fracture 09/24/2021    Anxiety     Anxiety state, unspecified 05/03/2010    Atypical chest pain     Breast cancer metastasized to axillary lymph node ([facility] [address] 09/03/2014    Cancer ([facility] [address] 1998    endometrial cancer and breast    Diverticulosis     DJD (degenerative joint disease)     Endometrial cancer ([facility] 03/20/2014    Gallstones     GERD (gastroesophageal reflux disease)     Hiatal hernia     Hypertension     Nausea & vomiting     OA (osteoarthritis) 05/03/2010    Osteoarthritis of right hip 09/20/2012    PUD (peptic ulcer disease)     BLEEDING AFTER THR    Stenosis, spinal, lumbar        Past Surgical History:   Procedure Laterality Date    HX BLADDER SUSPENSION      HX CATARACT REMOVAL Bilateral     HX DILATION AND CURETTAGE  1998    HX GYN  2003    PELVIC FLOOR RECONSTRUCTION    HX HIP REPLACEMENT  2013    Right    HX ORTHOPAEDIC Right 2012    THR    HX OTHER SURGICAL  1999    A&P REPAIR    HX SHOULDER REPLACEMENT  2/2010    Left shoulder    HX MAXIMO AND BSO  1998    HX WISDOM TEETH EXTRACTION      [facility] UNLISTED PROCEDURE BREAST      lumpectomy 05/22/14    [place] UNLISTED PROCEDURE VASCULAR SURGERY  1966    vein stripping left leg         Family History:   Problem Relation Age of Onset    Stroke Mother     Lung Disease Mother     Heart Disease Father     Cancer Other         breast    Post-op Nausea/Vomiting Other     Anesth Problems Other         PONV    Other Grandchild         GENETIC DJD    No Known Problems Daughter        Social History     Socioeconomic History    Marital status:      Spouse name: Not on file    Number of children: Not on file    Years of education: Not on file    Highest education level: Not on file   Occupational History    Not on file   Tobacco Use    Smoking status: Never    Smokeless tobacco: Never   Vaping Use    Vaping Use: Never used   Substance and Sexual Activity    Alcohol use:  Yes     Alcohol/week: 1.0 standard drink     Types: 1 Glasses of wine per week     Comment: \"VERY LITTLE\"    Drug use: No    Sexual activity: Yes     Partners: Male   Other Topics Concern    Not on file   Social History Narrative    Not on file     Social Determinants of Health     Financial Resource Strain: Low Risk     Difficulty of Paying Living Expenses: Not hard at all   Food Insecurity: No Food Insecurity    Worried About Running Out of Food in the Last Year: Never true    Ran Out of Food in the Last Year: Never true   Transportation Needs: Not on file   Physical Activity: Not on file   Stress: Not on file   Social Connections: Not on file   Intimate Partner Violence: Not on file   Housing Stability: Not on file         ALLERGIES: Oxycontin [oxycodone], Celebrex [celecoxib], Ciprofloxacin, E-mycin [erythromycin], Keflex [cephalexin], Morphine, Pcn [penicillins], and Tetracycline    Review of Systems   Constitutional:  Positive for activity change, appetite change and fatigue. Negative for chills and fever. HENT:  Positive for congestion and rhinorrhea. Negative for sinus pressure, sneezing and sore throat. Eyes:  Negative for photophobia and visual disturbance. Respiratory:  Positive for cough. Negative for shortness of breath. Cardiovascular:  Negative for chest pain. Gastrointestinal:  Positive for diarrhea, nausea and vomiting. Negative for abdominal pain, blood in stool and constipation. Genitourinary:  Negative for difficulty urinating, dysuria, flank pain, frequency, hematuria, menstrual problem, urgency, vaginal bleeding and vaginal discharge. Musculoskeletal:  Negative for arthralgias, back pain, myalgias and neck pain. Skin:  Negative for rash and wound. Neurological:  Positive for light-headedness and headaches. Negative for dizziness, syncope, weakness and numbness. Psychiatric/Behavioral:  Negative for self-injury and suicidal ideas. All other systems reviewed and are negative. Vitals:    02/12/23 1438   BP: (!) 153/101   Pulse: 96   Resp: 16   Temp: 98.2 °F (36.8 °C)   SpO2: 97%   Weight: 73.7 kg (162 lb 7.7 oz)   Height: 5' 6\" (1.676 m)            Physical Exam  Vitals and nursing note reviewed. Constitutional:       General: She is not in acute distress. Appearance: Normal appearance. She is well-developed.  She is not diaphoretic. Comments: Very pleasant, no acute distress, speaking in full sentences. Ambulatory with rollator. HENT:      Head: Normocephalic and atraumatic. Nose: Nose normal.   Eyes:      Extraocular Movements: Extraocular movements intact. Conjunctiva/sclera: Conjunctivae normal.      Pupils: Pupils are equal, round, and reactive to light. Cardiovascular:      Rate and Rhythm: Normal rate and regular rhythm. Heart sounds: Normal heart sounds. Pulmonary:      Effort: Pulmonary effort is normal.      Breath sounds: Normal breath sounds. Abdominal:      General: There is no distension. Palpations: Abdomen is soft. Tenderness: There is no abdominal tenderness. Musculoskeletal:         General: No tenderness. Cervical back: Neck supple. Skin:     General: Skin is warm and dry. Neurological:      General: No focal deficit present. Mental Status: She is alert and oriented to person, place, and time. Cranial Nerves: No cranial nerve deficit. Sensory: No sensory deficit. Motor: No weakness. Coordination: Coordination normal.      Comments: Intact sensation, 5/5 strength in all 4 extremities, intact finger to nose, neg pronator drift, fluent speech, CN intact. Psychiatric:         Mood and Affect: Mood is anxious. Medical Decision Making  Amount and/or Complexity of Data Reviewed  Labs: ordered. ECG/medicine tests: ordered. Risk  Prescription drug management. 44-year-old female presents with recent viral gastroenteritis type symptoms and fatigue and orthostatic lightheadedness. Neuro intact, as above. Initial BP slightly elevated 153/101 but improved with rest down to 664L systolic. Afebrile with other vital signs stable. Benign abdomen    She was given IV fluid bolus and had improvement in her symptoms. Labs returned very reassuringly showing no significant abnormalities. Reassurance was given.   She was recommended increase p.o. fluid intake and Rx Zofran for symptomatic relief as needed. Suspect viral etiology for her symptoms and will likely gradually improve over the next few days. Recommended PCP follow-up for further evaluation of her symptoms and to further discuss her blood pressure. This plan was discussed with the patient and her daughter at the bedside and they stated boith  understanding and agreement. Please note that this dictation was completed with [employer], the computer voice recognition software. Quite often unanticipated grammatical, syntax, homophones, and other interpretive errors are inadvertently transcribed by the computer software. Please disregard these errors. Please excuse any errors that have escaped final proofreading. Procedures    1454 EKG shows normal sinus rhythm with a rate of 87 bpm with no acute ST or T wave abnormality suggestive of ischemia.

## 2023-02-13 ENCOUNTER — OFFICE VISIT (OUTPATIENT)
Dept: INTERNAL MEDICINE CLINIC | Age: 88
End: 2023-02-13
Payer: MEDICARE

## 2023-02-13 VITALS
WEIGHT: 151 LBS | DIASTOLIC BLOOD PRESSURE: 102 MMHG | BODY MASS INDEX: 24.27 KG/M2 | TEMPERATURE: 98.7 F | HEART RATE: 99 BPM | SYSTOLIC BLOOD PRESSURE: 151 MMHG | RESPIRATION RATE: 14 BRPM | OXYGEN SATURATION: 97 % | HEIGHT: 66 IN

## 2023-02-13 DIAGNOSIS — F41.9 ANXIETY: ICD-10-CM

## 2023-02-13 DIAGNOSIS — I10 HYPERTENSION, UNSPECIFIED TYPE: Primary | ICD-10-CM

## 2023-02-13 PROCEDURE — G8536 NO DOC ELDER MAL SCRN: HCPCS | Performed by: INTERNAL MEDICINE

## 2023-02-13 PROCEDURE — G8432 DEP SCR NOT DOC, RNG: HCPCS | Performed by: INTERNAL MEDICINE

## 2023-02-13 PROCEDURE — 99214 OFFICE O/P EST MOD 30 MIN: CPT | Performed by: INTERNAL MEDICINE

## 2023-02-13 PROCEDURE — G0463 HOSPITAL OUTPT CLINIC VISIT: HCPCS | Performed by: INTERNAL MEDICINE

## 2023-02-13 PROCEDURE — 1090F PRES/ABSN URINE INCON ASSESS: CPT | Performed by: INTERNAL MEDICINE

## 2023-02-13 PROCEDURE — G8427 DOCREV CUR MEDS BY ELIG CLIN: HCPCS | Performed by: INTERNAL MEDICINE

## 2023-02-13 PROCEDURE — G8420 CALC BMI NORM PARAMETERS: HCPCS | Performed by: INTERNAL MEDICINE

## 2023-02-13 PROCEDURE — 1101F PT FALLS ASSESS-DOCD LE1/YR: CPT | Performed by: INTERNAL MEDICINE

## 2023-02-13 RX ORDER — LISINOPRIL 10 MG/1
5 TABLET ORAL DAILY
Qty: 30 TABLET | Refills: 0 | Status: SHIPPED | OUTPATIENT
Start: 2023-02-13

## 2023-02-13 RX ORDER — PAROXETINE 10 MG/1
10 TABLET, FILM COATED ORAL DAILY
Qty: 30 TABLET | Refills: 1 | Status: SHIPPED | OUTPATIENT
Start: 2023-02-13

## 2023-02-13 NOTE — PROGRESS NOTES
HISTORY OF PRESENT ILLNESS  Shekhar James is a 81 y.o. female. HPI  Patient was seen in the ER yesterday with elevated blood pressure readings. She reports that she took Lisinopril in the past  for her blood pressure but that it was discontinued it after experiencing a 25 lb weight loss after her 's death. She reports that over the past 2 weeks her blood pressure has been running high 140's-160's/90's-109. Yesterday she had a posterior occipital headache and nausea which precipitated her going to the ER. She was treated with IVF's and Zofran with improvement. Labs were stable. Patient lives at [facility] and Dr. Karon Mcfadden had physician changed her Paxil to Lexapro. She states that when she saw Dr. Irma Dalal earlier this month he increased her Lexapro dose to 20 mg daily and advised her to take it in the morning as she was having difficulty falling asleep. Patient admits that she has not been taking her Lexapro regularly and never increased her dose to 20 mg. She has felt a little lightheaded, but denies syncope. She denies chest pain, shortness of breath or palpitations. She attributes the lightheadedness to Lexapro and would like to resume taking Paxil instead.     Patient Active Problem List   Diagnosis Code    Essential hypertension I10    Anxiety state F41.1    OA (osteoarthritis) M19.90    Hyperglycemia R73.9    Osteoarthritis of right hip M16.11    Vitamin D deficiency E55.9    Advance directive declined by patient Z78.9    History of endometrial cancer Z85.42    History of breast cancer Z85.3    Age-related osteoporosis without current pathological fracture M81.0    Malignant neoplasm of right female breast, unspecified estrogen receptor status, unspecified site of breast ([facility] C50.911    Lumbosacral spondylosis without myelopathy M47.817     Current Outpatient Medications on File Prior to Visit   Medication Sig Dispense Refill    ondansetron (ZOFRAN ODT) 4 mg disintegrating tablet Take 1 Tablet by mouth every eight (8) hours as needed for Nausea or Vomiting. 20 Tablet 0    Omeprazole delayed release (PRILOSEC D/R) 20 mg tablet Take 20 mg by mouth daily. inulin (Fiber Gummies) 2 gram chew Take 2 Tablets by mouth daily. magnesium oxide (MAG-OX) 400 mg tablet Take 1 Tablet by mouth daily as needed (constipation). escitalopram oxalate (LEXAPRO) 20 mg tablet Take 1 Tablet by mouth daily. 90 Tablet 1    multivitamin (ONE A DAY) tablet Take 1 Tablet by mouth daily. busPIRone (BUSPAR) 10 mg tablet Take 1 Tablet by mouth three (3) times daily as needed (anxiety). 90 Tablet 0    traZODone (DESYREL) 50 mg tablet Take 12.5 mg by mouth as needed for Sleep.      denosumab (Prolia) 60 mg/mL injection 60 mg by SubCUTAneous route every 6 months. Current Facility-Administered Medications on File Prior to Visit   Medication Dose Route Frequency Provider Last Rate Last Admin    [COMPLETED] sodium chloride 0.9 % bolus infusion 1,000 mL  1,000 mL IntraVENous ONCE [facility], DO   IV Completed at 02/12/23 1626       ROS  Per HPI  Physical Exam  Anxious appearing woman in no apparent distress   Visit Vitals  BP (!) 151/102 (BP 1 Location: Left upper arm, BP Patient Position: Sitting, BP Cuff Size: Small adult)   Pulse 99   Temp 98.7 °F (37.1 °C) (Temporal)   Resp 14   Ht 5' 6\" (1.676 m)   Wt 151 lb (68.5 kg)   SpO2 97%   BMI 24.37 kg/m²     Normocephalic  Heart[de-identified] normal rate, regular rhythm, normal S1, S2, no murmurs, rubs, clicks or gallops. Chest: clear to auscultation, no wheezes, rales or rhonchi,   Extremities: no edema    ASSESSMENT and PLAN  Hypertension:Begin  low dose Lisinopril 5 mg every day to make sure that it is tolerated and that she does not have worsening lightheadedness. Advised her to follow up with Dr. Jordan Winchester in 1 month  Anxiety: Paxil 10 mg every day. Discontinue Lexapro. Reviewed need to take medication daily      Follow-up Disposition:  Return if symptoms worsen or fail to improve. Advised to call back or return to office if symptoms worsen/change/persist.  Discussed expected course/resolution/complications of diagnosis in detail with patient. follow up 1 month with Lytle Harada, MD     Medication risks/benefits/costs/interactions/alternatives discussed with patient. She was given an after visit summary which includes diagnoses, current medications, & vitals. She expressed understanding with the diagnosis and plan.

## 2023-02-13 NOTE — PATIENT INSTRUCTIONS
Discontinue Lexapro  Paxil 10 mg daily  Lisinopril 5 mg daily  Call or return to clinic if these symptoms worsen or fail to improve as anticipated.   follow up with Farida Boland MD in 1 month

## 2023-02-23 ENCOUNTER — TELEPHONE (OUTPATIENT)
Dept: INTERNAL MEDICINE CLINIC | Age: 88
End: 2023-02-23

## 2023-03-01 ENCOUNTER — OFFICE VISIT (OUTPATIENT)
Dept: INTERNAL MEDICINE CLINIC | Age: 88
End: 2023-03-01
Payer: MEDICARE

## 2023-03-01 VITALS
WEIGHT: 148 LBS | DIASTOLIC BLOOD PRESSURE: 81 MMHG | OXYGEN SATURATION: 98 % | HEART RATE: 76 BPM | RESPIRATION RATE: 16 BRPM | HEIGHT: 66 IN | SYSTOLIC BLOOD PRESSURE: 132 MMHG | TEMPERATURE: 97.6 F | BODY MASS INDEX: 23.78 KG/M2

## 2023-03-01 DIAGNOSIS — F41.1 ANXIETY STATE: ICD-10-CM

## 2023-03-01 DIAGNOSIS — I10 ESSENTIAL HYPERTENSION: Primary | ICD-10-CM

## 2023-03-01 DIAGNOSIS — K59.00 CONSTIPATION, UNSPECIFIED CONSTIPATION TYPE: ICD-10-CM

## 2023-03-01 DIAGNOSIS — K44.9 HIATAL HERNIA: ICD-10-CM

## 2023-03-01 PROCEDURE — G8420 CALC BMI NORM PARAMETERS: HCPCS | Performed by: INTERNAL MEDICINE

## 2023-03-01 PROCEDURE — G8427 DOCREV CUR MEDS BY ELIG CLIN: HCPCS | Performed by: INTERNAL MEDICINE

## 2023-03-01 PROCEDURE — G8536 NO DOC ELDER MAL SCRN: HCPCS | Performed by: INTERNAL MEDICINE

## 2023-03-01 PROCEDURE — 1090F PRES/ABSN URINE INCON ASSESS: CPT | Performed by: INTERNAL MEDICINE

## 2023-03-01 PROCEDURE — G0463 HOSPITAL OUTPT CLINIC VISIT: HCPCS | Performed by: INTERNAL MEDICINE

## 2023-03-01 PROCEDURE — 99214 OFFICE O/P EST MOD 30 MIN: CPT | Performed by: INTERNAL MEDICINE

## 2023-03-01 PROCEDURE — 1101F PT FALLS ASSESS-DOCD LE1/YR: CPT | Performed by: INTERNAL MEDICINE

## 2023-03-01 PROCEDURE — G8510 SCR DEP NEG, NO PLAN REQD: HCPCS | Performed by: INTERNAL MEDICINE

## 2023-03-01 RX ORDER — SENNOSIDES 8.6 MG/1
1 TABLET ORAL AS NEEDED
COMMUNITY

## 2023-03-01 RX ORDER — HYDROGEN PEROXIDE 3 %
20 SOLUTION, NON-ORAL MISCELLANEOUS DAILY
COMMUNITY

## 2023-03-01 RX ORDER — POLYETHYLENE GLYCOL 3350 17 G/17G
17 POWDER, FOR SOLUTION ORAL DAILY
COMMUNITY
Start: 2023-03-01

## 2023-03-01 RX ORDER — ASPIRIN 81 MG
TABLET, DELAYED RELEASE (ENTERIC COATED) ORAL 2 TIMES DAILY
COMMUNITY

## 2023-03-01 RX ORDER — PSYLLIUM HUSK (WITH SUGAR) 3.4 G/7 G
2 POWDER (GRAM) ORAL DAILY
COMMUNITY
Start: 2023-03-01

## 2023-03-01 NOTE — PROGRESS NOTES
HPI:  Ankit Kaiser is a 81y.o. year old female who is here for a follow-up visit. Since she was here last her blood pressures been quite elevated. She was seen at the clinic and it was elevated at [facility]. She was also seen in the emergency room and here for follow-up. Lisinopril has been added and increased to 10 mg a day. She is currently using buspirone up to 3 times a day and it does seem to help with her anxiety. She did see psychiatry at [facility] and they made no changes in her medicines. She does have ongoing issues with constipation. She has been taking medications as needed with limited success. She is sleeping better on the current meds.       Past Medical History:   Diagnosis Date    Age-related osteoporosis without current pathological fracture 09/24/2021    Anxiety     Anxiety state, unspecified 05/03/2010    Atypical chest pain     Breast cancer metastasized to axillary lymph node ([address] 09/03/2014    Cancer ([address] 1998    endometrial cancer and breast    Diverticulosis     DJD (degenerative joint disease)     Endometrial cancer ([facility] 03/20/2014    Gallstones     GERD (gastroesophageal reflux disease)     Hiatal hernia     Hypertension     Nausea & vomiting     OA (osteoarthritis) 05/03/2010    Osteoarthritis of right hip 09/20/2012    PUD (peptic ulcer disease)     BLEEDING AFTER THR    Stenosis, spinal, lumbar        Past Surgical History:   Procedure Laterality Date    HX BLADDER SUSPENSION      HX CATARACT REMOVAL Bilateral     HX DILATION AND CURETTAGE  01/01/1998    HX GYN  01/01/2003    PELVIC FLOOR RECONSTRUCTION    HX HIP REPLACEMENT  01/01/2013    Right    HX ORTHOPAEDIC Right 01/01/2012    THR    HX OTHER SURGICAL  01/01/1999    A&P REPAIR    HX SHOULDER REPLACEMENT  02/01/2010    Left shoulder    HX MAXIMO AND BSO  01/01/1998    HX WISDOM TEETH EXTRACTION      [place] UNLISTED PROCEDURE ABDOMEN PERITONEUM & OMENTUM      VT UNLISTED PROCEDURE BREAST      lumpectomy 05/22/14    [place] UNLISTED PROCEDURE VASCULAR SURGERY  01/01/1966    vein stripping left leg       Prior to Admission medications    Medication Sig Start Date End Date Taking? Authorizing Provider   esomeprazole (NexIUM) 20 mg capsule Take 20 mg by mouth daily. Yes Provider, Historical   docusate sodium (Stool Softener) 100 mg tab Take  by mouth two (2) times a day. Yes Provider, Historical   Senna 8.6 mg tablet Take 1 Tablet by mouth as needed for Constipation. Yes Provider, Historical   polyethylene glycol (MIRALAX) 17 gram/dose powder Take 17 g by mouth daily. 3/1/23  Yes Olivia Cho MD   inulin (Fiber Gummies) 2 gram chew Take 2 Tablets by mouth daily. 3/1/23  Yes Mavis Foley III, MD   PARoxetine (PAXIL) 10 mg tablet Take 1 Tablet by mouth daily. 2/13/23  Yes Bird Curran MD   lisinopriL (PRINIVIL, ZESTRIL) 10 mg tablet Take 0.5 Tablets by mouth daily. Patient taking differently: Take 10 mg by mouth daily. 2/13/23  Yes Luis Corral MD   ondansetron (ZOFRAN ODT) 4 mg disintegrating tablet Take 1 Tablet by mouth every eight (8) hours as needed for Nausea or Vomiting. 2/12/23  Yes Lisandra Paiz DO   inulin (Fiber Gummies) 2 gram chew Take 2 Tablets by mouth daily. 2/2/23  Yes Olivia Cho MD   magnesium oxide (MAG-OX) 400 mg tablet Take 1 Tablet by mouth daily as needed (constipation). 2/2/23  Yes Olivia Cho MD   multivitamin (ONE A DAY) tablet Take 1 Tablet by mouth daily. 2/2/23  Yes Olivia Cho MD   busPIRone (BUSPAR) 10 mg tablet Take 1 Tablet by mouth three (3) times daily as needed (anxiety). 1/18/23  Yes Olivia Cho MD   traZODone (DESYREL) 50 mg tablet Take 12.5 mg by mouth as needed for Sleep. Yes Provider, Historical   denosumab (Prolia) 60 mg/mL injection 60 mg by SubCUTAneous route every 6 months. Yes Provider, Historical   Omeprazole delayed release (PRILOSEC D/R) 20 mg tablet Take 20 mg by mouth daily.     Provider, Historical   escitalopram oxalate (LEXAPRO) 20 mg tablet Take 1 Tablet by mouth daily. 2/2/23 3/1/23  Florentino Eldridge MD       Social History     Socioeconomic History    Marital status:      Spouse name: Not on file    Number of children: Not on file    Years of education: Not on file    Highest education level: Not on file   Occupational History    Not on file   Tobacco Use    Smoking status: Never    Smokeless tobacco: Never   Vaping Use    Vaping Use: Never used   Substance and Sexual Activity    Alcohol use: Yes     Alcohol/week: 1.0 standard drink     Types: 1 Glasses of wine per week     Comment: \"VERY LITTLE\"    Drug use: No    Sexual activity: Not Currently     Partners: Male   Other Topics Concern    Not on file   Social History Narrative    Not on file     Social Determinants of Health     Financial Resource Strain: Low Risk     Difficulty of Paying Living Expenses: Not hard at all   Food Insecurity: No Food Insecurity    Worried About Running Out of Food in the Last Year: Never true    Ran Out of Food in the Last Year: Never true   Transportation Needs: Not on file   Physical Activity: Not on file   Stress: Not on file   Social Connections: Not on file   Intimate Partner Violence: Not on file   Housing Stability: Not on file          ROS  Per HPI    Visit Vitals  /81   Pulse 76   Temp 97.6 °F (36.4 °C) (Temporal)   Resp 16   Ht 5' 6\" (1.676 m)   Wt 148 lb (67.1 kg)   SpO2 98%   BMI 23.89 kg/m²         Physical Exam   Physical Examination: General appearance - alert, well appearing, and in no distress  Mental status - alert, oriented to person, place, and time  Neurological - alert, oriented, normal speech, no focal findings or movement disorder noted, motor and sensory grossly normal bilaterally      Assessment/Plan:  Diagnoses and all orders for this visit:    1. Essential hypertension-blood pressure better controlled. We will continue current meds as they are for now.   She has been checking her blood pressure as many as 30 times a day. I suggested she check it once a day and record the readings. 2. Hiatal hernia-has been concerned about it being problematic for her. Encouraged her that it would only increase reflux symptoms. It is unlikely to become problematic otherwise. 3. Anxiety state-consider increasing Paxil to 20 mg a day. Continue trazodone. She will follow-up with psychiatry as planned. 4. Constipation, unspecified constipation type-developed a plan of taking Colace, MiraLAX, fiber Gummies daily and using Senokot on an as-needed basis. Advised her to call back or return to office if symptoms worsen/change/persist.  Discussed expected course/resolution/complications of diagnosis in detail with patient. Medication risks/benefits/costs/interactions/alternatives discussed with patient. She was given an after visit summary which includes diagnoses, current medications, & vitals. She expressed understanding with the diagnosis and plan.

## 2023-03-29 ENCOUNTER — OFFICE VISIT (OUTPATIENT)
Dept: INTERNAL MEDICINE CLINIC | Age: 88
End: 2023-03-29
Payer: MEDICARE

## 2023-03-29 VITALS
WEIGHT: 146.4 LBS | TEMPERATURE: 98.3 F | OXYGEN SATURATION: 98 % | BODY MASS INDEX: 23.53 KG/M2 | SYSTOLIC BLOOD PRESSURE: 121 MMHG | HEIGHT: 66 IN | RESPIRATION RATE: 16 BRPM | HEART RATE: 86 BPM | DIASTOLIC BLOOD PRESSURE: 76 MMHG

## 2023-03-29 DIAGNOSIS — I10 ESSENTIAL HYPERTENSION: Primary | ICD-10-CM

## 2023-03-29 DIAGNOSIS — F41.9 ANXIETY: ICD-10-CM

## 2023-03-29 DIAGNOSIS — K59.00 CONSTIPATION, UNSPECIFIED CONSTIPATION TYPE: ICD-10-CM

## 2023-03-29 DIAGNOSIS — K44.9 HIATAL HERNIA: ICD-10-CM

## 2023-03-29 DIAGNOSIS — F51.04 PSYCHOPHYSIOLOGICAL INSOMNIA: ICD-10-CM

## 2023-03-29 PROCEDURE — G8536 NO DOC ELDER MAL SCRN: HCPCS | Performed by: INTERNAL MEDICINE

## 2023-03-29 PROCEDURE — 99214 OFFICE O/P EST MOD 30 MIN: CPT | Performed by: INTERNAL MEDICINE

## 2023-03-29 PROCEDURE — 1090F PRES/ABSN URINE INCON ASSESS: CPT | Performed by: INTERNAL MEDICINE

## 2023-03-29 PROCEDURE — G0463 HOSPITAL OUTPT CLINIC VISIT: HCPCS | Performed by: INTERNAL MEDICINE

## 2023-03-29 PROCEDURE — G8420 CALC BMI NORM PARAMETERS: HCPCS | Performed by: INTERNAL MEDICINE

## 2023-03-29 PROCEDURE — G8427 DOCREV CUR MEDS BY ELIG CLIN: HCPCS | Performed by: INTERNAL MEDICINE

## 2023-03-29 PROCEDURE — 1101F PT FALLS ASSESS-DOCD LE1/YR: CPT | Performed by: INTERNAL MEDICINE

## 2023-03-29 PROCEDURE — G8432 DEP SCR NOT DOC, RNG: HCPCS | Performed by: INTERNAL MEDICINE

## 2023-03-29 NOTE — PROGRESS NOTES
HPI:  Tyra Armas is a 81y.o. year old female who is here for a follow-up visit. Her blood pressures at home have been under much better control. She has had no symptoms of high or low blood pressures and she has been checking about once per day. She has become much more engaged in social activities. Her bowel movements are much improved using Colace, MiraLAX, and adding Senokot as needed. She is also gone back to her cup of coffee a day and that is helped a lot as well. Her anxiety is under much better control. She does continue to feel some intermittent episodes of loneliness and anxiety particularly in the evening. She did meet with a psychiatrist who suggested that I manage her medications for this and suggest changes. She continues to be concerned about habituation and addiction to her different medications. Her weight is stable and actually may have increased a pound by her scales.       Past Medical History:   Diagnosis Date    Age-related osteoporosis without current pathological fracture 09/24/2021    Anxiety     Anxiety state, unspecified 05/03/2010    Atypical chest pain     Breast cancer metastasized to axillary lymph node ([address] 09/03/2014    Cancer ([address] 1998    endometrial cancer and breast    Diverticulosis     DJD (degenerative joint disease)     Endometrial cancer ([address] 03/20/2014    Gallstones     GERD (gastroesophageal reflux disease)     Hiatal hernia     Hypertension     Nausea & vomiting     OA (osteoarthritis) 05/03/2010    Osteoarthritis of right hip 09/20/2012    PUD (peptic ulcer disease)     BLEEDING AFTER THR    Stenosis, spinal, lumbar        Past Surgical History:   Procedure Laterality Date    HX BLADDER SUSPENSION      HX CATARACT REMOVAL Bilateral     HX DILATION AND CURETTAGE  01/01/1998    HX GYN  01/01/2003    PELVIC FLOOR RECONSTRUCTION    HX HIP REPLACEMENT  01/01/2013    Right    HX ORTHOPAEDIC Right 01/01/2012    THR    HX OTHER SURGICAL  01/01/1999    A&P REPAIR HX SHOULDER REPLACEMENT  02/01/2010    Left shoulder    HX MAXIMO AND BSO  01/01/1998    HX WISDOM TEETH EXTRACTION      [place] UNLISTED PROCEDURE ABDOMEN PERITONEUM & OMENTUM      IA UNLISTED PROCEDURE BREAST      lumpectomy 05/22/14    IA UNLISTED PROCEDURE VASCULAR SURGERY  01/01/1966    vein stripping left leg       Prior to Admission medications    Medication Sig Start Date End Date Taking? Authorizing Provider   esomeprazole (NEXIUM) 20 mg capsule Take 20 mg by mouth daily. Yes Provider, Historical   docusate sodium 100 mg tab Take  by mouth two (2) times a day. Yes Provider, Historical   senna (SENOKOT) 8.6 mg tablet Take 1 Tablet by mouth as needed for Constipation. Yes Provider, Historical   polyethylene glycol (MIRALAX) 17 gram/dose powder Take 17 g by mouth daily. 3/1/23  Yes Naun Mayo MD   inulin (Fiber Gummies) 2 gram chew Take 2 Tablets by mouth daily. 3/1/23  Yes Chana More III, MD   PARoxetine (PAXIL) 10 mg tablet Take 1 Tablet by mouth daily. 2/13/23  Yes Cristiana Curran MD   lisinopriL (PRINIVIL, ZESTRIL) 10 mg tablet Take 0.5 Tablets by mouth daily. Patient taking differently: Take 10 mg by mouth daily. 2/13/23  Yes Chuck Perez MD   ondansetron (ZOFRAN ODT) 4 mg disintegrating tablet Take 1 Tablet by mouth every eight (8) hours as needed for Nausea or Vomiting. 2/12/23  Yes Linette [facility], DO   multivitamin (ONE A DAY) tablet Take 1 Tablet by mouth daily. 2/2/23  Yes Naun Mayo MD   busPIRone (BUSPAR) 10 mg tablet Take 1 Tablet by mouth three (3) times daily as needed (anxiety). 1/18/23  Yes Naun Mayo MD   traZODone (DESYREL) 50 mg tablet Take 12.5 mg by mouth as needed for Sleep. Yes Provider, Historical   denosumab (Prolia) 60 mg/mL injection 60 mg by SubCUTAneous route every 6 months. Yes Provider, Historical   Omeprazole delayed release (PRILOSEC D/R) 20 mg tablet Take 20 mg by mouth daily.   3/29/23  Provider, Historical   inulin (Fiber Gummies) 2 gram chew Take 2 Tablets by mouth daily. 2/2/23 3/29/23  Damian Barton III, MD   magnesium oxide (MAG-OX) 400 mg tablet Take 1 Tablet by mouth daily as needed (constipation). 2/2/23 3/29/23  More Griffith MD       Social History     Socioeconomic History    Marital status:      Spouse name: Not on file    Number of children: Not on file    Years of education: Not on file    Highest education level: Not on file   Occupational History    Not on file   Tobacco Use    Smoking status: Never    Smokeless tobacco: Never   Vaping Use    Vaping Use: Never used   Substance and Sexual Activity    Alcohol use: Yes     Alcohol/week: 1.0 standard drink     Types: 1 Glasses of wine per week     Comment: \"VERY LITTLE\"    Drug use: No    Sexual activity: Not Currently     Partners: Male   Other Topics Concern    Not on file   Social History Narrative    Not on file     Social Determinants of Health     Financial Resource Strain: Low Risk     Difficulty of Paying Living Expenses: Not hard at all   Food Insecurity: No Food Insecurity    Worried About Running Out of Food in the Last Year: Never true    Ran Out of Food in the Last Year: Never true   Transportation Needs: Not on file   Physical Activity: Not on file   Stress: Not on file   Social Connections: Not on file   Intimate Partner Violence: Not on file   Housing Stability: Not on file          ROS  Per HPI    Visit Vitals  /76 (BP 1 Location: Right arm, BP Patient Position: Sitting, BP Cuff Size: Large adult)   Pulse 86   Temp 98.3 °F (36.8 °C) (Oral)   Resp 16   Ht 5' 6\" (1.676 m)   Wt 146 lb 6.4 oz (66.4 kg)   SpO2 98%   BMI 23.63 kg/m²         Physical Exam   Physical Examination: General appearance - alert, well appearing, and in no distress  Mental status - alert, oriented to person, place, and time      Assessment/Plan:  Diagnoses and all orders for this visit:    1.  Essential hypertension-much better controlled on current meds and will continue these. 2. Hiatal hernia-stable and asymptomatic. 3. Constipation, unspecified constipation type-much improved on her current regimen and will continue these. 4. Psychophysiological insomnia-stable on Paxil and low-dose trazodone. 5. Anxiety-improved. We discussed possibly increasing Paxil or using scheduled buspirone. She is going to try 10 mg of BuSpar with each meal and let me know if symptoms do not improve. We will make a follow-up visit here in 6 months. Advised her to call back or return to office if symptoms worsen/change/persist.  Discussed expected course/resolution/complications of diagnosis in detail with patient. Medication risks/benefits/costs/interactions/alternatives discussed with patient. She was given an after visit summary which includes diagnoses, current medications, & vitals. She expressed understanding with the diagnosis and plan.

## 2023-03-31 DIAGNOSIS — M16.11 OSTEOARTHRITIS OF RIGHT HIP, UNSPECIFIED OSTEOARTHRITIS TYPE: ICD-10-CM

## 2023-03-31 DIAGNOSIS — M81.0 AGE-RELATED OSTEOPOROSIS WITHOUT CURRENT PATHOLOGICAL FRACTURE: Primary | ICD-10-CM

## 2023-04-07 ENCOUNTER — HOSPITAL ENCOUNTER (OUTPATIENT)
Dept: INFUSION THERAPY | Age: 88
End: 2023-04-07
Payer: MEDICARE

## 2023-04-19 ENCOUNTER — TELEPHONE (OUTPATIENT)
Dept: INTERNAL MEDICINE CLINIC | Age: 88
End: 2023-04-19

## 2023-04-19 NOTE — TELEPHONE ENCOUNTER
Reason for call:  TC from pt. Pt id verified. Pt states she had a spike in her BP; feels it may be due to anxiety. Pt inquiring if BP medication needs to be increased. Pt request a call from nurse to discuss BP medication.     Is this a new problem: yes     Date of last appointment:  3/29/2023     Can we respond via [employer]: no    Best call back number: [phone]

## 2023-04-20 RX ORDER — LISINOPRIL 10 MG/1
10 TABLET ORAL 2 TIMES DAILY
Qty: 180 TABLET | Refills: 0 | Status: SHIPPED | OUTPATIENT
Start: 2023-04-20 | End: 2023-07-19

## 2023-04-20 NOTE — TELEPHONE ENCOUNTER
Per Dr. Lena Santacruz patient to increase lisinopril to 10 mg BID and continue to monitor. Patient voiced understanding - requested refill to [facility] w/ new instructions. Orders Placed This Encounter    lisinopriL (PRINIVIL, ZESTRIL) 10 mg tablet     Sig: Take 1 Tablet by mouth two (2) times a day for 90 days. Dispense:  180 Tablet     Refill:  0     The above orders were approved via [facility] per Dr. Hardik Peck, III.

## 2023-04-20 NOTE — TELEPHONE ENCOUNTER
Pt reports yesterday she was anxious and had headache, at PT her BP was 164/94 - , repeat 30 minutes later 148/92 - . She decided to go to the [facility] after PT and by then BP was 176/102 (no pulse taken). Today no headache - BP now is 153/93 - . Currently taking Lisinopril 10 mg once daily for BP. Will review w/ PCP.

## 2023-04-26 ENCOUNTER — TELEPHONE (OUTPATIENT)
Dept: INTERNAL MEDICINE CLINIC | Age: 88
End: 2023-04-26

## 2023-04-26 NOTE — TELEPHONE ENCOUNTER
Reason for call:  Please call to discuss her blood pressure meds. What mg should she be doing?     Is this a new problem: yes     Date of last appointment:  3/29/2023     Can we respond via [employer]: no    Best call back number: Tammie Leann [phone] with [facility]

## 2023-04-26 NOTE — TELEPHONE ENCOUNTER
Returned call to [facility] [address] w/ [address] notified MD increased lisinopril from 10 mg daily to 10 mg BID. [place] reports pt is only taking 15 mg once in the AM.  BP today at clinic is 126/87. Please advise if pt can remain at 15 mg.

## 2023-05-03 ENCOUNTER — TELEPHONE (OUTPATIENT)
Dept: INTERNAL MEDICINE CLINIC | Age: 88
End: 2023-05-03

## 2023-05-03 ENCOUNTER — OFFICE VISIT (OUTPATIENT)
Dept: INTERNAL MEDICINE CLINIC | Age: 88
End: 2023-05-03
Payer: MEDICARE

## 2023-05-03 VITALS
HEART RATE: 113 BPM | DIASTOLIC BLOOD PRESSURE: 98 MMHG | HEIGHT: 66 IN | TEMPERATURE: 97.6 F | BODY MASS INDEX: 22.76 KG/M2 | OXYGEN SATURATION: 98 % | RESPIRATION RATE: 12 BRPM | WEIGHT: 141.6 LBS | SYSTOLIC BLOOD PRESSURE: 158 MMHG

## 2023-05-03 DIAGNOSIS — F51.04 PSYCHOPHYSIOLOGICAL INSOMNIA: ICD-10-CM

## 2023-05-03 DIAGNOSIS — F41.9 ANXIETY: ICD-10-CM

## 2023-05-03 DIAGNOSIS — I10 ESSENTIAL HYPERTENSION: Primary | ICD-10-CM

## 2023-05-03 PROCEDURE — 1090F PRES/ABSN URINE INCON ASSESS: CPT | Performed by: INTERNAL MEDICINE

## 2023-05-03 PROCEDURE — 99214 OFFICE O/P EST MOD 30 MIN: CPT | Performed by: INTERNAL MEDICINE

## 2023-05-03 PROCEDURE — G0463 HOSPITAL OUTPT CLINIC VISIT: HCPCS | Performed by: INTERNAL MEDICINE

## 2023-05-03 PROCEDURE — G8536 NO DOC ELDER MAL SCRN: HCPCS | Performed by: INTERNAL MEDICINE

## 2023-05-03 PROCEDURE — 1101F PT FALLS ASSESS-DOCD LE1/YR: CPT | Performed by: INTERNAL MEDICINE

## 2023-05-03 PROCEDURE — G8427 DOCREV CUR MEDS BY ELIG CLIN: HCPCS | Performed by: INTERNAL MEDICINE

## 2023-05-03 PROCEDURE — G8420 CALC BMI NORM PARAMETERS: HCPCS | Performed by: INTERNAL MEDICINE

## 2023-05-03 PROCEDURE — G8432 DEP SCR NOT DOC, RNG: HCPCS | Performed by: INTERNAL MEDICINE

## 2023-05-03 RX ORDER — CARVEDILOL 3.12 MG/1
3.12 TABLET ORAL 2 TIMES DAILY WITH MEALS
Qty: 60 TABLET | Refills: 1 | Status: SHIPPED | OUTPATIENT
Start: 2023-05-03

## 2023-06-18 RX ORDER — CARVEDILOL 3.12 MG/1
TABLET ORAL
Qty: 60 TABLET | Refills: 0 | Status: SHIPPED | OUTPATIENT
Start: 2023-06-18 | End: 2023-07-11

## 2023-07-10 ENCOUNTER — TELEPHONE (OUTPATIENT)
Age: 88
End: 2023-07-10

## 2023-07-10 NOTE — TELEPHONE ENCOUNTER
Reason for call:  TC from pt's daughter, Nitish Castellanos. Daughter on pt's PHI. Pt id verified. Daughter stated she wanted Dr. Fei Martinez to know that pt started taking a BP medication that he recommended to her (daughter had some trouble with pronunciation and spelling and PSR could not make out what she was trying to say). Pt has an appt to see Dr. Fei Martinez on 07/18 to discuss BP but daughter request call from nurse to discuss medication.      Is this a new problem: No    Date of last appointment:  5/3/2023     Can we respond via [employer]: No    Best call back number: [phone]

## 2023-07-11 RX ORDER — CARVEDILOL 3.12 MG/1
TABLET ORAL
Qty: 60 TABLET | Refills: 0 | Status: SHIPPED | OUTPATIENT
Start: 2023-07-11

## 2023-07-11 NOTE — TELEPHONE ENCOUNTER
Attempted to contact patient daughter Kathie Smyth - no answer, no options to leave message. Per SRJ he texted with Devinnidia Smyth yesterday evening and discussed her BP - should not need return call any longer.

## 2023-07-17 SDOH — ECONOMIC STABILITY: FOOD INSECURITY: WITHIN THE PAST 12 MONTHS, THE FOOD YOU BOUGHT JUST DIDN'T LAST AND YOU DIDN'T HAVE MONEY TO GET MORE.: NEVER TRUE

## 2023-07-17 SDOH — ECONOMIC STABILITY: TRANSPORTATION INSECURITY
IN THE PAST 12 MONTHS, HAS LACK OF TRANSPORTATION KEPT YOU FROM MEETINGS, WORK, OR FROM GETTING THINGS NEEDED FOR DAILY LIVING?: NO

## 2023-07-17 SDOH — ECONOMIC STABILITY: INCOME INSECURITY: HOW HARD IS IT FOR YOU TO PAY FOR THE VERY BASICS LIKE FOOD, HOUSING, MEDICAL CARE, AND HEATING?: NOT HARD AT ALL

## 2023-07-17 SDOH — ECONOMIC STABILITY: FOOD INSECURITY: WITHIN THE PAST 12 MONTHS, YOU WORRIED THAT YOUR FOOD WOULD RUN OUT BEFORE YOU GOT MONEY TO BUY MORE.: NEVER TRUE

## 2023-07-17 SDOH — ECONOMIC STABILITY: HOUSING INSECURITY
IN THE LAST 12 MONTHS, WAS THERE A TIME WHEN YOU DID NOT HAVE A STEADY PLACE TO SLEEP OR SLEPT IN A SHELTER (INCLUDING NOW)?: NO

## 2023-07-18 ENCOUNTER — OFFICE VISIT (OUTPATIENT)
Age: 88
End: 2023-07-18
Payer: MEDICARE

## 2023-07-18 VITALS
TEMPERATURE: 98.2 F | HEIGHT: 66 IN | BODY MASS INDEX: 23.5 KG/M2 | WEIGHT: 146.2 LBS | SYSTOLIC BLOOD PRESSURE: 131 MMHG | RESPIRATION RATE: 15 BRPM | OXYGEN SATURATION: 99 % | DIASTOLIC BLOOD PRESSURE: 84 MMHG | HEART RATE: 74 BPM

## 2023-07-18 DIAGNOSIS — K59.00 CONSTIPATION, UNSPECIFIED CONSTIPATION TYPE: ICD-10-CM

## 2023-07-18 DIAGNOSIS — I10 ESSENTIAL (PRIMARY) HYPERTENSION: Primary | ICD-10-CM

## 2023-07-18 DIAGNOSIS — F41.1 GENERALIZED ANXIETY DISORDER: ICD-10-CM

## 2023-07-18 DIAGNOSIS — K44.9 DIAPHRAGMATIC HERNIA WITHOUT OBSTRUCTION OR GANGRENE: ICD-10-CM

## 2023-07-18 PROCEDURE — G8427 DOCREV CUR MEDS BY ELIG CLIN: HCPCS | Performed by: INTERNAL MEDICINE

## 2023-07-18 PROCEDURE — 1036F TOBACCO NON-USER: CPT | Performed by: INTERNAL MEDICINE

## 2023-07-18 PROCEDURE — 1090F PRES/ABSN URINE INCON ASSESS: CPT | Performed by: INTERNAL MEDICINE

## 2023-07-18 PROCEDURE — 1123F ACP DISCUSS/DSCN MKR DOCD: CPT | Performed by: INTERNAL MEDICINE

## 2023-07-18 PROCEDURE — G8420 CALC BMI NORM PARAMETERS: HCPCS | Performed by: INTERNAL MEDICINE

## 2023-07-18 PROCEDURE — 99214 OFFICE O/P EST MOD 30 MIN: CPT | Performed by: INTERNAL MEDICINE

## 2023-07-18 RX ORDER — PYRIDOXINE HCL (VITAMIN B6) 50 MG
TABLET ORAL
COMMUNITY

## 2023-07-18 RX ORDER — CARVEDILOL 3.12 MG/1
3.12 TABLET ORAL 2 TIMES DAILY WITH MEALS
Qty: 60 TABLET | Refills: 0 | COMMUNITY
Start: 2023-07-18

## 2023-07-18 NOTE — PROGRESS NOTES
OTHER SURGICAL HISTORY  01/01/1999    A&P REPAIR    [place] UNLISTED PROCEDURE ABDOMEN PERITONEUM & OMENTUM      SHOULDER ARTHROPLASTY  02/01/2010    Left shoulder    VIVIANA AND BSO (CERVIX REMOVED)  01/01/1998    TOTAL HIP ARTHROPLASTY  01/01/2013    Right    VASCULAR SURGERY  01/01/1966    vein stripping left leg    WISDOM TOOTH EXTRACTION         Prior to Admission medications    Medication Sig Start Date End Date Taking? Authorizing Provider   Multiple Vitamin (MULTIVITAMIN ADULT PO) Take by mouth   Yes Historical Provider, MD   Cyanocobalamin (B-12) 100 MCG TABS Take by mouth   Yes Historical Provider, MD   carvedilol (COREG) 3.125 MG tablet Take 1 tablet by mouth 2 times daily (with meals) 7/18/23  Yes Raffy Hicks MD   esomeprazole (NEXIUM) 20 MG delayed release capsule Take 1 capsule by mouth daily 7/18/23  Yes Raffy Hicks MD   busPIRone (BUSPAR) 10 MG tablet Take by mouth 3 times daily as needed 1/18/23  Yes Ar Automatic Reconciliation   denosumab (PROLIA) 60 MG/ML SOSY SC injection Inject into the skin   Yes Ar Automatic Reconciliation   Docusate Sodium 100 MG TABS Take by mouth 2 times daily   Yes Ar Automatic Reconciliation   lisinopril (PRINIVIL;ZESTRIL) 10 MG tablet Take by mouth in the morning and at bedtime 2/13/23  Yes Ar Automatic Reconciliation   omeprazole (PRILOSEC OTC) 20 MG tablet Take by mouth daily   Yes Ar Automatic Reconciliation   PARoxetine (PAXIL) 10 MG tablet Take by mouth daily 2/13/23  Yes Ar Automatic Reconciliation   senna (SENOKOT) 8.6 MG tablet Take 1 tablet by mouth as needed   Yes Ar Automatic Reconciliation   traZODone (DESYREL) 50 MG tablet Take 0.5 tablets by mouth as needed   Yes Ar Automatic Reconciliation       Social History     Socioeconomic History    Marital status:       Spouse name: Not on file    Number of children: Not on file    Years of education: Not on file    Highest education level: Not on file   Occupational History    Not on file   Tobacco Use

## 2023-08-17 RX ORDER — CARVEDILOL 3.12 MG/1
TABLET ORAL
Qty: 180 TABLET | Refills: 2 | Status: SHIPPED | OUTPATIENT
Start: 2023-08-17

## 2023-08-17 RX ORDER — LISINOPRIL 10 MG/1
TABLET ORAL
Qty: 180 TABLET | Refills: 2 | Status: SHIPPED | OUTPATIENT
Start: 2023-08-17

## 2023-08-17 NOTE — TELEPHONE ENCOUNTER
Medication Refill Request    Alba Bean is requesting a refill of the following medication(s):     carvedilol (COREG) 3.125 MG tablet    lisinopril (PRINIVIL;ZESTRIL) 10 MG tablet    Per would like to inform Dr Keyur Palmer, she still having BP spikes    Please send refill to:     [facility]/pharmacy [id number] [facility] [phone]  [address] [address]  Phone: [phone] Fax: [phone]

## 2023-09-04 NOTE — PROGRESS NOTES
HPI:  Arthor Aschoff is a 81y.o. year old female who is here for a transfer of care appointment after having been seen by the physician at [facility] for the last year or so. She has had several issues since I saw her last.  She had 2 falls. 1 episode of abdominal discomfort at which she was diagnosed with a large para esophageal hernia as well as diverticulosis. She has had ongoing issues with anxiety as well as insomnia. Multiple medications have been tried since she was here last.  She has continued to have issues with sleep but has not been taking any medication consistently due to concerns about addiction. She has been on Lexapro 10 mg for about 6 months. She does continue to struggle with anxiety and some worries. Her bowel movements continue to be a problem with constipation. She is concerned about the use of PPIs and the long-term consequences of those as she is read about those. She does feel that using low-dose trazodone helps with sleep when she uses it.       Past Medical History:   Diagnosis Date    Age-related osteoporosis without current pathological fracture 09/24/2021    Anxiety     Anxiety state, unspecified 05/03/2010    Atypical chest pain     Breast cancer metastasized to axillary lymph node ([address] 09/03/2014    Cancer ([address] 1998    endometrial cancer and breast    Diverticulosis     DJD (degenerative joint disease)     Endometrial cancer ([address] 03/20/2014    Gallstones     GERD (gastroesophageal reflux disease)     Hiatal hernia     Hypertension     Nausea & vomiting     OA (osteoarthritis) 05/03/2010    Osteoarthritis of right hip 09/20/2012    PUD (peptic ulcer disease)     BLEEDING AFTER THR    Stenosis, spinal, lumbar        Past Surgical History:   Procedure Laterality Date    HX BLADDER SUSPENSION      HX CATARACT REMOVAL Bilateral     HX DILATION AND CURETTAGE  1998    HX GYN  2003    PELVIC FLOOR RECONSTRUCTION    HX HIP REPLACEMENT  2013    Right    HX ORTHOPAEDIC Right 2012 THR    HX OTHER SURGICAL  1999    A&P REPAIR    HX SHOULDER REPLACEMENT  2/2010    Left shoulder    HX MAXIMO AND BSO  1998    HX WISDOM TEETH EXTRACTION      MO UNLISTED PROCEDURE BREAST      lumpectomy 05/22/14    MO UNLISTED PROCEDURE VASCULAR SURGERY  1966    vein stripping left leg       Prior to Admission medications    Medication Sig Start Date End Date Taking? Authorizing Provider   Omeprazole delayed release (PRILOSEC D/R) 20 mg tablet Take 20 mg by mouth daily. Yes Provider, Historical   inulin (Fiber Gummies) 2 gram chew Take 2 Tablets by mouth daily. 2/2/23  Yes Leonel Le MD   magnesium oxide (MAG-OX) 400 mg tablet Take 1 Tablet by mouth daily as needed (constipation). 2/2/23  Yes Leonel Le MD   escitalopram oxalate (LEXAPRO) 20 mg tablet Take 1 Tablet by mouth daily. 2/2/23  Yes Leonel Le MD   multivitamin (ONE A DAY) tablet Take 1 Tablet by mouth daily. 2/2/23  Yes Leonel Le MD   busPIRone (BUSPAR) 10 mg tablet Take 1 Tablet by mouth three (3) times daily as needed (anxiety). 1/18/23  Yes Leonel Le MD   traZODone (DESYREL) 50 mg tablet Take 12.5 mg by mouth as needed for Sleep. Yes Provider, Historical   denosumab (Prolia) 60 mg/mL injection 60 mg by SubCUTAneous route every 6 months. Yes Provider, Historical   DULCOLAX, MAGNESIUM HYDROXIDE, PO Take  by mouth. Patient not taking: Reported on 2/2/2023 2/2/23  Provider, Historical   zaleplon (SONATA) 5 mg capsule Take  by mouth nightly. 2/2/23  Provider, Historical   famotidine (PEPCID) 20 mg tablet Take 20 mg by mouth two (2) times a day. Patient not taking: Reported on 2/2/2023 2/2/23  Provider, Historical   escitalopram oxalate (LEXAPRO) 10 mg tablet Take 10 mg by mouth in the morning.   2/2/23  Other, MD Nya       Social History     Socioeconomic History    Marital status:      Spouse name: Not on file    Number of children: Not on file    Years of education: Not on file Highest education level: Not on file   Occupational History    Not on file   Tobacco Use    Smoking status: Never    Smokeless tobacco: Never   Vaping Use    Vaping Use: Never used   Substance and Sexual Activity    Alcohol use: Yes     Alcohol/week: 1.0 standard drink     Types: 1 Glasses of wine per week     Comment: \"VERY LITTLE\"    Drug use: No    Sexual activity: Yes     Partners: Male   Other Topics Concern    Not on file   Social History Narrative    Not on file     Social Determinants of Health     Financial Resource Strain: Low Risk     Difficulty of Paying Living Expenses: Not hard at all   Food Insecurity: No Food Insecurity    Worried About Running Out of Food in the Last Year: Never true    Ran Out of Food in the Last Year: Never true   Transportation Needs: Not on file   Physical Activity: Not on file   Stress: Not on file   Social Connections: Not on file   Intimate Partner Violence: Not on file   Housing Stability: Not on file          ROS  Per HPI    Visit Vitals  BP (!) 138/90   Temp 98.4 °F (36.9 °C) (Temporal)   Resp 18   Ht 5' 6\" (1.676 m)   Wt 152 lb (68.9 kg)   BMI 24.53 kg/m²         Physical Exam   Physical Examination: General appearance - alert, well appearing, and in no distress  Mental status - alert, oriented to person, place, and time  Ears - bilateral TM's and external ear canals normal  Neck - supple, no significant adenopathy  Lymphatics - no palpable lymphadenopathy, no hepatosplenomegaly  Chest - clear to auscultation, no wheezes, rales or rhonchi, symmetric air entry  Heart - normal rate, regular rhythm, normal S1, S2, no murmurs, rubs, clicks or gallops  Neurological - alert, oriented, normal speech, no focal findings or movement disorder noted  Extremities - peripheral pulses normal, no pedal edema, no clubbing or cyanosis      Assessment/Plan:  Diagnoses and all orders for this visit:    1. Anxiety state-not well controlled. Will increase Lexapro to 20 mg a day.   We will also have her use BuSpar as needed. We will follow-up in 2 months on how she is doing. 2. Malignant neoplasm of right female breast, unspecified estrogen receptor status, unspecified site of breast (HCC)-in remission. 3. Hyperglycemia-reviewed recent labs with her. Her hemoglobin A1c was 5.8% and reasonably controlled. 4. Essential hypertension-off all medications for this. We will monitor blood pressures at home over the next 2 weeks and provide me readings. 5. Hiatal hernia-discussed the risk benefit of proton pump inhibitors. Advised her to continue taking her multivitamin and continue meds for now. She has seen a surgeon who suggested deferral of surgery. 6. Diverticulosis-add fiber supplements. 7. Constipation, unspecified constipation type-add fiber supplements as well as magnesium as needed and will let me know if not improving. Other orders  -     escitalopram oxalate (LEXAPRO) 20 mg tablet; Take 1 Tablet by mouth daily. Advised her to call back or return to office if symptoms worsen/change/persist.  Discussed expected course/resolution/complications of diagnosis in detail with patient. Medication risks/benefits/costs/interactions/alternatives discussed with patient. She was given an after visit summary which includes diagnoses, current medications, & vitals. She expressed understanding with the diagnosis and plan. Simple: Patient demonstrates quick and easy understanding/Verbalized Understanding

## 2023-09-29 PROBLEM — M81.0 OSTEOPOROSIS: Status: ACTIVE | Noted: 2023-09-29

## 2023-10-03 SDOH — HEALTH STABILITY: PHYSICAL HEALTH: ON AVERAGE, HOW MANY MINUTES DO YOU ENGAGE IN EXERCISE AT THIS LEVEL?: 20 MIN

## 2023-10-03 SDOH — HEALTH STABILITY: PHYSICAL HEALTH: ON AVERAGE, HOW MANY DAYS PER WEEK DO YOU ENGAGE IN MODERATE TO STRENUOUS EXERCISE (LIKE A BRISK WALK)?: 5 DAYS

## 2023-10-03 ASSESSMENT — PATIENT HEALTH QUESTIONNAIRE - PHQ9
1. LITTLE INTEREST OR PLEASURE IN DOING THINGS: 0
SUM OF ALL RESPONSES TO PHQ9 QUESTIONS 1 & 2: 1
2. FEELING DOWN, DEPRESSED OR HOPELESS: 1
SUM OF ALL RESPONSES TO PHQ QUESTIONS 1-9: 1

## 2023-10-03 ASSESSMENT — LIFESTYLE VARIABLES
HOW MANY STANDARD DRINKS CONTAINING ALCOHOL DO YOU HAVE ON A TYPICAL DAY: 1 OR 2
HOW OFTEN DO YOU HAVE A DRINK CONTAINING ALCOHOL: MONTHLY OR LESS
HOW OFTEN DO YOU HAVE A DRINK CONTAINING ALCOHOL: 2
HOW MANY STANDARD DRINKS CONTAINING ALCOHOL DO YOU HAVE ON A TYPICAL DAY: 1
HOW OFTEN DO YOU HAVE SIX OR MORE DRINKS ON ONE OCCASION: 1

## 2023-10-04 ENCOUNTER — OFFICE VISIT (OUTPATIENT)
Age: 88
End: 2023-10-04
Payer: MEDICARE

## 2023-10-04 VITALS
OXYGEN SATURATION: 100 % | TEMPERATURE: 97.8 F | BODY MASS INDEX: 23.4 KG/M2 | WEIGHT: 145.6 LBS | RESPIRATION RATE: 15 BRPM | HEIGHT: 66 IN | HEART RATE: 74 BPM | SYSTOLIC BLOOD PRESSURE: 132 MMHG | DIASTOLIC BLOOD PRESSURE: 85 MMHG

## 2023-10-04 DIAGNOSIS — I10 ESSENTIAL (PRIMARY) HYPERTENSION: ICD-10-CM

## 2023-10-04 DIAGNOSIS — R73.9 HYPERGLYCEMIA, UNSPECIFIED: ICD-10-CM

## 2023-10-04 DIAGNOSIS — Z00.00 MEDICARE ANNUAL WELLNESS VISIT, SUBSEQUENT: Primary | ICD-10-CM

## 2023-10-04 DIAGNOSIS — N30.00 ACUTE CYSTITIS WITHOUT HEMATURIA: ICD-10-CM

## 2023-10-04 DIAGNOSIS — M81.0 AGE RELATED OSTEOPOROSIS, UNSPECIFIED PATHOLOGICAL FRACTURE PRESENCE: ICD-10-CM

## 2023-10-04 DIAGNOSIS — F41.1 GENERALIZED ANXIETY DISORDER: ICD-10-CM

## 2023-10-04 DIAGNOSIS — C50.911 MALIGNANT NEOPLASM OF RIGHT FEMALE BREAST, UNSPECIFIED ESTROGEN RECEPTOR STATUS, UNSPECIFIED SITE OF BREAST (HCC): ICD-10-CM

## 2023-10-04 DIAGNOSIS — K59.00 CONSTIPATION, UNSPECIFIED CONSTIPATION TYPE: ICD-10-CM

## 2023-10-04 PROCEDURE — G0439 PPPS, SUBSEQ VISIT: HCPCS | Performed by: INTERNAL MEDICINE

## 2023-10-04 PROCEDURE — G8420 CALC BMI NORM PARAMETERS: HCPCS | Performed by: INTERNAL MEDICINE

## 2023-10-04 PROCEDURE — 1036F TOBACCO NON-USER: CPT | Performed by: INTERNAL MEDICINE

## 2023-10-04 PROCEDURE — 1090F PRES/ABSN URINE INCON ASSESS: CPT | Performed by: INTERNAL MEDICINE

## 2023-10-04 PROCEDURE — 99214 OFFICE O/P EST MOD 30 MIN: CPT | Performed by: INTERNAL MEDICINE

## 2023-10-04 PROCEDURE — G8484 FLU IMMUNIZE NO ADMIN: HCPCS | Performed by: INTERNAL MEDICINE

## 2023-10-04 PROCEDURE — G8427 DOCREV CUR MEDS BY ELIG CLIN: HCPCS | Performed by: INTERNAL MEDICINE

## 2023-10-04 PROCEDURE — 1123F ACP DISCUSS/DSCN MKR DOCD: CPT | Performed by: INTERNAL MEDICINE

## 2023-10-04 RX ORDER — BUSPIRONE HYDROCHLORIDE 15 MG/1
15 TABLET ORAL 3 TIMES DAILY
Qty: 90 TABLET | Refills: 2 | Status: SHIPPED | OUTPATIENT
Start: 2023-10-04

## 2023-10-04 NOTE — PROGRESS NOTES
Reconciliation, Ar   omeprazole (PRILOSEC OTC) 20 MG tablet Take by mouth daily Yes Automatic Reconciliation, Ar   senna (SENOKOT) 8.6 MG tablet Take 1 tablet by mouth as needed Yes Automatic Reconciliation, Ar   traZODone (DESYREL) 50 MG tablet Take 0.5 tablets by mouth as needed Yes Automatic Reconciliation, Ar       CareTeam (Including outside providers/suppliers regularly involved in providing care):   Patient Care Team:  Dennis Angelucci, MD as PCP - General  Dennis Angelucci, MD as PCP - Empaneled Provider  Saleem Larry MD as Physician  Arjun Funk MD as Physician  Shashi Meyers MD as Physician  Carole Alvarado MD as Surgeon  Daniela Esteban MD as Surgeon     Reviewed and updated this visit:  Tobacco  Allergies  Meds  Med Hx  Surg Hx  Soc Hx  Fam Hx

## 2023-10-05 LAB
ALBUMIN SERPL-MCNC: 4 G/DL (ref 3.5–5)
ALBUMIN/GLOB SERPL: 1.5 (ref 1.1–2.2)
ALP SERPL-CCNC: 55 U/L (ref 45–117)
ALT SERPL-CCNC: 26 U/L (ref 12–78)
ANION GAP SERPL CALC-SCNC: 5 MMOL/L (ref 5–15)
APPEARANCE UR: ABNORMAL
AST SERPL-CCNC: 16 U/L (ref 15–37)
BACTERIA URNS QL MICRO: NEGATIVE /HPF
BASOPHILS # BLD: 0 K/UL (ref 0–0.1)
BASOPHILS NFR BLD: 0 % (ref 0–1)
BILIRUB SERPL-MCNC: 0.6 MG/DL (ref 0.2–1)
BILIRUB UR QL: NEGATIVE
BUN SERPL-MCNC: 17 MG/DL (ref 6–20)
BUN/CREAT SERPL: 24 (ref 12–20)
CALCIUM SERPL-MCNC: 9.1 MG/DL (ref 8.5–10.1)
CHLORIDE SERPL-SCNC: 100 MMOL/L (ref 97–108)
CO2 SERPL-SCNC: 25 MMOL/L (ref 21–32)
COLOR UR: ABNORMAL
CREAT SERPL-MCNC: 0.71 MG/DL (ref 0.55–1.02)
DIFFERENTIAL METHOD BLD: ABNORMAL
EOSINOPHIL # BLD: 0.1 K/UL (ref 0–0.4)
EOSINOPHIL NFR BLD: 2 % (ref 0–7)
EPITH CASTS URNS QL MICRO: ABNORMAL /LPF
ERYTHROCYTE [DISTWIDTH] IN BLOOD BY AUTOMATED COUNT: 14.6 % (ref 11.5–14.5)
EST. AVERAGE GLUCOSE BLD GHB EST-MCNC: 128 MG/DL
GLOBULIN SER CALC-MCNC: 2.7 G/DL (ref 2–4)
GLUCOSE SERPL-MCNC: 133 MG/DL (ref 65–100)
GLUCOSE UR STRIP.AUTO-MCNC: NEGATIVE MG/DL
HBA1C MFR BLD: 6.1 % (ref 4–5.6)
HCT VFR BLD AUTO: 35.6 % (ref 35–47)
HGB BLD-MCNC: 11.7 G/DL (ref 11.5–16)
HGB UR QL STRIP: ABNORMAL
IMM GRANULOCYTES # BLD AUTO: 0 K/UL (ref 0–0.04)
IMM GRANULOCYTES NFR BLD AUTO: 0 % (ref 0–0.5)
KETONES UR QL STRIP.AUTO: NEGATIVE MG/DL
LEUKOCYTE ESTERASE UR QL STRIP.AUTO: ABNORMAL
LYMPHOCYTES # BLD: 0.9 K/UL (ref 0.8–3.5)
LYMPHOCYTES NFR BLD: 18 % (ref 12–49)
MAGNESIUM SERPL-MCNC: 2.3 MG/DL (ref 1.6–2.4)
MCH RBC QN AUTO: 29.4 PG (ref 26–34)
MCHC RBC AUTO-ENTMCNC: 32.9 G/DL (ref 30–36.5)
MCV RBC AUTO: 89.4 FL (ref 80–99)
MONOCYTES # BLD: 0.5 K/UL (ref 0–1)
MONOCYTES NFR BLD: 10 % (ref 5–13)
NEUTS SEG # BLD: 3.3 K/UL (ref 1.8–8)
NEUTS SEG NFR BLD: 70 % (ref 32–75)
NITRITE UR QL STRIP.AUTO: NEGATIVE
NRBC # BLD: 0 K/UL (ref 0–0.01)
NRBC BLD-RTO: 0 PER 100 WBC
PH UR STRIP: 8 (ref 5–8)
PHOSPHATE SERPL-MCNC: 3.8 MG/DL (ref 2.6–4.7)
PLATELET # BLD AUTO: 230 K/UL (ref 150–400)
PMV BLD AUTO: 8.8 FL (ref 8.9–12.9)
POTASSIUM SERPL-SCNC: 4.7 MMOL/L (ref 3.5–5.1)
PROT SERPL-MCNC: 6.7 G/DL (ref 6.4–8.2)
PROT UR STRIP-MCNC: NEGATIVE MG/DL
RBC # BLD AUTO: 3.98 M/UL (ref 3.8–5.2)
RBC #/AREA URNS HPF: ABNORMAL /HPF (ref 0–5)
SODIUM SERPL-SCNC: 130 MMOL/L (ref 136–145)
SP GR UR REFRACTOMETRY: 1.01 (ref 1–1.03)
TRI-PHOS CRY URNS QL MICRO: ABNORMAL
TSH SERPL DL<=0.05 MIU/L-ACNC: 0.94 UIU/ML (ref 0.36–3.74)
UROBILINOGEN UR QL STRIP.AUTO: 0.2 EU/DL (ref 0.2–1)
WBC # BLD AUTO: 4.7 K/UL (ref 3.6–11)
WBC URNS QL MICRO: ABNORMAL /HPF (ref 0–4)

## 2023-10-06 ENCOUNTER — HOSPITAL ENCOUNTER (OUTPATIENT)
Facility: HOSPITAL | Age: 88
Setting detail: INFUSION SERIES
End: 2023-10-06
Payer: MEDICARE

## 2023-10-06 ENCOUNTER — APPOINTMENT (OUTPATIENT)
Dept: INFUSION THERAPY | Age: 88
End: 2023-10-06

## 2023-10-06 VITALS
DIASTOLIC BLOOD PRESSURE: 78 MMHG | RESPIRATION RATE: 18 BRPM | HEART RATE: 84 BPM | TEMPERATURE: 97.9 F | SYSTOLIC BLOOD PRESSURE: 131 MMHG

## 2023-10-06 DIAGNOSIS — M81.0 OSTEOPOROSIS, UNSPECIFIED OSTEOPOROSIS TYPE, UNSPECIFIED PATHOLOGICAL FRACTURE PRESENCE: Primary | ICD-10-CM

## 2023-10-06 PROCEDURE — 96372 THER/PROPH/DIAG INJ SC/IM: CPT

## 2023-10-06 PROCEDURE — 6360000002 HC RX W HCPCS: Performed by: INTERNAL MEDICINE

## 2023-10-06 RX ADMIN — DENOSUMAB 60 MG: 60 INJECTION SUBCUTANEOUS at 14:11

## 2023-10-07 LAB
BACTERIA SPEC CULT: ABNORMAL
CC UR VC: ABNORMAL
SERVICE CMNT-IMP: ABNORMAL

## 2023-10-08 RX ORDER — SULFAMETHOXAZOLE AND TRIMETHOPRIM 800; 160 MG/1; MG/1
1 TABLET ORAL 2 TIMES DAILY
Qty: 14 TABLET | Refills: 0 | OUTPATIENT
Start: 2023-10-08 | End: 2023-10-15

## 2023-10-10 ENCOUNTER — TELEPHONE (OUTPATIENT)
Age: 88
End: 2023-10-10

## 2023-10-10 NOTE — TELEPHONE ENCOUNTER
Spoke with Ailyn Daniels, verified pt Idx2. Ailyn Daniels reports pts last BM was last Wednesday, she is taking 2 stool softeners QHS and 2 Senakot QHS - last night she had a total of 4 Senna's per SRJ instructions and still not relief. Now pt has abdominal discomfort, distention, and bloating. Pt has a fleets enema on hand and daughter would like to know if that is safe for pt to use or should she try alternative? Memory Burns I will check w/ PCP and return call with instruction.

## 2023-10-10 NOTE — TELEPHONE ENCOUNTER
Returned call to Boubacar Li, notified per SRJ okay to use fleet enema, encouraged to stay hydrated to prevent dehydration, Boubacar Li voiced understanding and will relay to pt. no lesions,  no deformities,  no traumatic injuries,  no significant scars are present,  chest wall non-tender,  no masses present, breathing is unlabored without accessory muscle use,normal breath sounds

## 2023-10-10 NOTE — TELEPHONE ENCOUNTER
Reason for call:  Spoke with Tan Chappell, She requested a call back from nurse in regards pt health issue She would not provide any other information.      Is this a new problem: Yes    Date of last appointment:  10/4/2023     Can we respond via pinion-pinshart: No    Best call back number: Aide Patel   [phone]

## 2023-10-20 ENCOUNTER — TELEPHONE (OUTPATIENT)
Age: 88
End: 2023-10-20

## 2023-10-21 ENCOUNTER — TELEPHONE (OUTPATIENT)
Age: 88
End: 2023-10-21

## 2023-10-21 RX ORDER — BUSPIRONE HYDROCHLORIDE 10 MG/1
10 TABLET ORAL 4 TIMES DAILY
Qty: 120 TABLET | Refills: 0 | Status: SHIPPED | OUTPATIENT
Start: 2023-10-21 | End: 2023-11-20

## 2023-10-21 RX ORDER — BUSPIRONE HYDROCHLORIDE 10 MG/1
10 TABLET ORAL 2 TIMES DAILY
Qty: 60 TABLET | Refills: 0 | Status: CANCELLED | OUTPATIENT
Start: 2023-10-21 | End: 2023-11-20

## 2023-10-21 NOTE — TELEPHONE ENCOUNTER
Patient called on call and requested to return to 10 mg 4 times daily buspar. She felt \"funny\" in her head when she took the 15 mg with her trazodone. She had weird dreams also and felt \"strange\" the next morning.

## 2023-10-23 ENCOUNTER — TELEPHONE (OUTPATIENT)
Age: 88
End: 2023-10-23

## 2023-10-23 NOTE — TELEPHONE ENCOUNTER
Keisha Perry MD  [facility]  Caller: Unspecified (3 days ago,  2:42 PM)  She can skip the buspar or take it earlier if needed. Then trazodone at bedtime. Spoke with pt's daughter Nimo Benz (on HIPAA), advised per ' note regarding pt taking Buspar and Trazodone. Pt's daughter verbalized understanding.

## 2023-11-06 RX ORDER — BUSPIRONE HYDROCHLORIDE 10 MG/1
10 TABLET ORAL 4 TIMES DAILY
Qty: 120 TABLET | Refills: 0 | Status: SHIPPED | OUTPATIENT
Start: 2023-11-06

## 2023-12-01 ENCOUNTER — PATIENT MESSAGE (OUTPATIENT)
Age: 88
End: 2023-12-01

## 2023-12-01 DIAGNOSIS — G89.29 CHRONIC RIGHT SHOULDER PAIN: ICD-10-CM

## 2023-12-01 DIAGNOSIS — M25.511 CHRONIC RIGHT SHOULDER PAIN: ICD-10-CM

## 2023-12-01 DIAGNOSIS — M70.61 TROCHANTERIC BURSITIS, RIGHT HIP: Primary | ICD-10-CM

## 2023-12-01 DIAGNOSIS — G89.29 OTHER CHRONIC PAIN: ICD-10-CM

## 2023-12-17 RX ORDER — BUSPIRONE HYDROCHLORIDE 10 MG/1
10 TABLET ORAL 4 TIMES DAILY
Qty: 120 TABLET | Refills: 0 | Status: SHIPPED | OUTPATIENT
Start: 2023-12-17

## 2023-12-26 ENCOUNTER — PATIENT MESSAGE (OUTPATIENT)
Age: 88
End: 2023-12-26

## 2023-12-26 NOTE — TELEPHONE ENCOUNTER
From: Rajiv Flowers  To: Dr. Hortencia Villa: 12/26/2023 12:50 PM EST  Subject: [id number] [facility]    Dr. Retha Aschoff would like to use the [id number] [facility] as her primary pharmacy from now on. The [address] [facility] will be a back up if needed. Please let me know if you need any additional information from the pharmacy. Many thanks!

## 2024-01-24 RX ORDER — LISINOPRIL 10 MG/1
10 TABLET ORAL 2 TIMES DAILY
Qty: 180 TABLET | Refills: 1 | Status: SHIPPED | OUTPATIENT
Start: 2024-01-24

## 2024-01-24 RX ORDER — BUSPIRONE HYDROCHLORIDE 10 MG/1
10 TABLET ORAL 4 TIMES DAILY
Qty: 360 TABLET | Refills: 1 | Status: SHIPPED | OUTPATIENT
Start: 2024-01-24

## 2024-01-24 RX ORDER — TRAZODONE HYDROCHLORIDE 50 MG/1
25 TABLET ORAL PRN
Qty: 90 TABLET | Refills: 1 | Status: SHIPPED | OUTPATIENT
Start: 2024-01-24

## 2024-01-24 RX ORDER — CARVEDILOL 3.12 MG/1
3.12 TABLET ORAL 2 TIMES DAILY WITH MEALS
Qty: 180 TABLET | Refills: 1 | Status: SHIPPED | OUTPATIENT
Start: 2024-01-24

## 2024-01-24 NOTE — TELEPHONE ENCOUNTER
Medication Refill Request    Nicole Henry is requesting a refill of the following medication(s):   busPIRone (BUSPAR) 10 MG tablet     carvedilol (COREG) 3.125 MG tablet     esomeprazole (NEXIUM) 20 MG delayed release capsule     lisinopril (PRINIVIL;ZESTRIL) 10 MG tablet     traZODone (DESYREL) 50 MG tablet           Please send refill to:     [facility] [phone] - F [phone]

## 2024-01-24 NOTE — TELEPHONE ENCOUNTER
Chief Complaint   Patient presents with    Medication Refill     Last Appointment with Dr. True Franks:  10/4/2023   Future Appointments   Date Time Provider Department Center   4/4/2024  2:00 PM True Franks MD [facility]   4/5/2024  2:00 PM GABRIELE VALENTINO [facility]

## 2024-02-04 RX ORDER — LISINOPRIL 10 MG/1
10 TABLET ORAL 2 TIMES DAILY
Qty: 180 TABLET | Refills: 2 | Status: SHIPPED | OUTPATIENT
Start: 2024-02-04

## 2024-02-15 ENCOUNTER — OFFICE VISIT (OUTPATIENT)
Age: 89
End: 2024-02-15
Payer: MEDICARE

## 2024-02-15 VITALS
HEART RATE: 83 BPM | HEIGHT: 66 IN | RESPIRATION RATE: 15 BRPM | OXYGEN SATURATION: 97 % | SYSTOLIC BLOOD PRESSURE: 135 MMHG | WEIGHT: 150.6 LBS | BODY MASS INDEX: 24.2 KG/M2 | TEMPERATURE: 98.6 F | DIASTOLIC BLOOD PRESSURE: 88 MMHG

## 2024-02-15 DIAGNOSIS — F41.1 GENERALIZED ANXIETY DISORDER: ICD-10-CM

## 2024-02-15 DIAGNOSIS — I10 ESSENTIAL (PRIMARY) HYPERTENSION: Primary | ICD-10-CM

## 2024-02-15 DIAGNOSIS — C50.911 MALIGNANT NEOPLASM OF RIGHT FEMALE BREAST, UNSPECIFIED ESTROGEN RECEPTOR STATUS, UNSPECIFIED SITE OF BREAST (HCC): ICD-10-CM

## 2024-02-15 DIAGNOSIS — K44.9 DIAPHRAGMATIC HERNIA WITHOUT OBSTRUCTION OR GANGRENE: ICD-10-CM

## 2024-02-15 DIAGNOSIS — M17.11 ARTHRITIS OF KNEE, RIGHT: ICD-10-CM

## 2024-02-15 DIAGNOSIS — R73.9 HYPERGLYCEMIA, UNSPECIFIED: ICD-10-CM

## 2024-02-15 DIAGNOSIS — M81.0 AGE RELATED OSTEOPOROSIS, UNSPECIFIED PATHOLOGICAL FRACTURE PRESENCE: ICD-10-CM

## 2024-02-15 PROCEDURE — G8427 DOCREV CUR MEDS BY ELIG CLIN: HCPCS | Performed by: INTERNAL MEDICINE

## 2024-02-15 PROCEDURE — G8484 FLU IMMUNIZE NO ADMIN: HCPCS | Performed by: INTERNAL MEDICINE

## 2024-02-15 PROCEDURE — 99214 OFFICE O/P EST MOD 30 MIN: CPT | Performed by: INTERNAL MEDICINE

## 2024-02-15 PROCEDURE — 1036F TOBACCO NON-USER: CPT | Performed by: INTERNAL MEDICINE

## 2024-02-15 PROCEDURE — 1090F PRES/ABSN URINE INCON ASSESS: CPT | Performed by: INTERNAL MEDICINE

## 2024-02-15 PROCEDURE — 1123F ACP DISCUSS/DSCN MKR DOCD: CPT | Performed by: INTERNAL MEDICINE

## 2024-02-15 PROCEDURE — G8420 CALC BMI NORM PARAMETERS: HCPCS | Performed by: INTERNAL MEDICINE

## 2024-02-15 RX ORDER — ACETAMINOPHEN 500 MG
500 TABLET ORAL EVERY 6 HOURS PRN
COMMUNITY

## 2024-02-15 NOTE — PROGRESS NOTES
HPI:  Nicole Henry is a 88 y.o. year old female who is here for a follow-up visit.  She continues to work with physical therapy for her right knee.  She is in a temporary brace right now but they feel she would benefit from an off  brace.  She has significant osteoarthritis there with pain with walking and movement.  She is using an assistive Rotablator to help.  She also finds that the trazodone at night is making her groggy in the morning time and wondered about stopping it.  She also has constipation and dry mouth as well.  She does find that BuSpar helps her calm down and wondered about using that for sleep as well.  She is using stool softeners daily and an occasional Senokot to help with bowel movements successfully.      Past Medical History:   Diagnosis Date    Age-related osteoporosis without current pathological fracture 09/24/2021    Anxiety     Anxiety state, unspecified 05/03/2010    Atypical chest pain     Breast cancer metastasized to axillary lymph node (HCC) 09/03/2014    Cancer ([facility]) 1998    endometrial cancer and breast    Diverticulosis     DJD (degenerative joint disease)     Endometrial cancer ([facility]) 03/20/2014    Gallstones     GERD (gastroesophageal reflux disease)     Hiatal hernia     Hypertension     Nausea & vomiting     OA (osteoarthritis) 05/03/2010    Osteoarthritis of right hip 09/20/2012    PUD (peptic ulcer disease)     BLEEDING AFTER THR    Stenosis, spinal, lumbar        Past Surgical History:   Procedure Laterality Date    BLADDER SUSPENSION      BREAST SURGERY      lumpectomy 05/22/14    CATARACT REMOVAL Bilateral     DILATION AND CURETTAGE OF UTERUS  01/01/1998    GYN  01/01/2003    PELVIC FLOOR RECONSTRUCTION    ORTHOPEDIC SURGERY Right 01/01/2012    THR    OTHER SURGICAL HISTORY  01/01/1999    A&P REPAIR    ME UNLISTED PROCEDURE ABDOMEN PERITONEUM & OMENTUM      SHOULDER ARTHROPLASTY  02/01/2010    Left shoulder    VIVIANA AND BSO (CERVIX REMOVED)  01/01/1998    TOTAL HIP

## 2024-02-21 ENCOUNTER — TELEPHONE (OUTPATIENT)
Age: 89
End: 2024-02-21

## 2024-02-21 DIAGNOSIS — M17.11 OSTEOARTHRITIS OF RIGHT KNEE, UNSPECIFIED OSTEOARTHRITIS TYPE: Primary | ICD-10-CM

## 2024-02-21 NOTE — TELEPHONE ENCOUNTER
How Severe Is Your Acne?: mild Reason for call:  TC from Modesta Holcomb at [facility]. Tracey calling to see if Dr. Franks had written an order for pt. Tracey stated the order would be for a knee brace. Tracey stated the order from Dr. Franks would need to read, \"Right-Offloader brace for rt knee due to rt knee osteoarthritis\". Tracey stated order and a copy of office notes in which Dr. Franks has talked with pt about the knee pain and brace needs to be faxed to Manoj Luevano at fax number: [phone] and to Tracey Zelaya at fax number: [phone]. Tracey stated if nurse needed to speak with her for additional information, she could reach her at phone number: [phone].     Is this a new problem: Yes    Date of last appointment:  2/15/2024     Can we respond via HubChillat: No    Best call back number: Tracey Zelaya/Modesta Zurita at [facility]/Phone Number: [phone]/Fax Number: [phone]    Is This A New Presentation, Or A Follow-Up?: Acne

## 2024-02-22 NOTE — TELEPHONE ENCOUNTER
Orders Placed This Encounter    DME Order for (Specify) as OP     - DME device ordered - Right Off- Knee Brace   - Diagnosis: Osteoarthritis, right knee (ICD-10:  M17.11)  - Length of Need: Lifetime     Scheduling Instructions:      Attn:  Manoj Luevano      Fax # [phone]            Attn:  Tracey Zelaya      Fax # [phone]       The above orders were approved via VORB per Dr. True Franks, III.

## 2024-02-29 RX ORDER — IPRATROPIUM BROMIDE 42 UG/1
2 SPRAY, METERED NASAL 4 TIMES DAILY PRN
Qty: 15 ML | Refills: 3 | Status: SHIPPED | OUTPATIENT
Start: 2024-02-29

## 2024-03-01 ENCOUNTER — TELEPHONE (OUTPATIENT)
Age: 89
End: 2024-03-01

## 2024-03-01 NOTE — TELEPHONE ENCOUNTER
Pt's daughter Yeimi called to let him know [facility] called her with concern of pt's /95 pulse 84. Pt had walked down there from her room which is long way. Pt denies any symptoms and feels fine. Having her hair done now. Yeimi just wanted to let MD know. Will forward to MD.

## 2024-04-05 ENCOUNTER — HOSPITAL ENCOUNTER (OUTPATIENT)
Facility: HOSPITAL | Age: 89
Setting detail: INFUSION SERIES
Discharge: HOME | End: 2024-04-05
Attending: INTERNAL MEDICINE
Payer: MEDICARE

## 2024-04-05 VITALS
HEART RATE: 82 BPM | SYSTOLIC BLOOD PRESSURE: 118 MMHG | RESPIRATION RATE: 18 BRPM | TEMPERATURE: 98 F | DIASTOLIC BLOOD PRESSURE: 83 MMHG

## 2024-04-05 DIAGNOSIS — M81.0 OSTEOPOROSIS, UNSPECIFIED OSTEOPOROSIS TYPE, UNSPECIFIED PATHOLOGICAL FRACTURE PRESENCE: Primary | ICD-10-CM

## 2024-04-05 PROCEDURE — 96372 THER/PROPH/DIAG INJ SC/IM: CPT

## 2024-04-05 PROCEDURE — 6360000002 HC RX W HCPCS: Performed by: INTERNAL MEDICINE

## 2024-04-05 RX ADMIN — DENOSUMAB 60 MG: 60 INJECTION SUBCUTANEOUS at 14:15

## 2024-04-05 ASSESSMENT — PAIN SCALES - GENERAL: PAINLEVEL_OUTOF10: 0

## 2024-04-05 NOTE — PROGRESS NOTES
[facility] Short Note                   Date: 2024    Name: Nicole Henry    MRN: [id number]         : 1935      1400 - Pt admit to OPI for Prolia q6 Months ambulatory in stable condition. Assessment completed. No new concerns voiced. No upcoming [facility] appointments, pt to follow up with healthcare provider.      Ms. Henry's vitals were reviewed prior to and after treatment.   Patient Vitals for the past 12 hrs:   Temp Pulse Resp BP   24 1412 98 °F (36.7 °C) 82 18 118/83       Lab results were reviewed. Labs scanned into Epic.   Please review pending lab results in Epic.  No results found for this or any previous visit (from the past 12 hour(s)).    Medications given:   Medications Administered         denosumab (PROLIA) SC injection 60 mg Admin Date  2024 Action  Given Dose  60 mg Route  SubCUTAneous Administered By  Eva Hodges, KARLY            Ms. Henry tolerated the procedure, and had no complaints.    [facility]     Future Appointments   Date Time Provider Department Center   2024  4:30 PM True Franks MD [facility] Hodges RN  2024  2:38 PM

## 2024-04-29 ENCOUNTER — TELEPHONE (OUTPATIENT)
Age: 89
End: 2024-04-29

## 2024-04-29 DIAGNOSIS — Z74.09 IMPAIRED MOBILITY AND ADLS: Primary | ICD-10-CM

## 2024-04-29 DIAGNOSIS — Z78.9 IMPAIRED MOBILITY AND ADLS: Primary | ICD-10-CM

## 2024-04-30 NOTE — TELEPHONE ENCOUNTER
Orders Placed This Encounter    External Referral To Occupational Therapy     Referral Priority:   Routine     Referral Type:   Eval and Treat     Referral Reason:   Specialty Services Required     Requested Specialty:   Occupational Therapy     Number of Visits Requested:   1       The above orders were approved via VORB per Dr. True Franks, III.

## 2024-05-09 ENCOUNTER — OFFICE VISIT (OUTPATIENT)
Age: 89
End: 2024-05-09
Payer: MEDICARE

## 2024-05-09 VITALS
WEIGHT: 145.2 LBS | OXYGEN SATURATION: 98 % | HEART RATE: 85 BPM | RESPIRATION RATE: 15 BRPM | TEMPERATURE: 98.2 F | DIASTOLIC BLOOD PRESSURE: 66 MMHG | HEIGHT: 66 IN | BODY MASS INDEX: 23.33 KG/M2 | SYSTOLIC BLOOD PRESSURE: 104 MMHG

## 2024-05-09 DIAGNOSIS — I10 ESSENTIAL (PRIMARY) HYPERTENSION: ICD-10-CM

## 2024-05-09 DIAGNOSIS — Z78.9 IMPAIRED MOBILITY AND ADLS: Primary | ICD-10-CM

## 2024-05-09 DIAGNOSIS — M41.9 SCOLIOSIS, UNSPECIFIED SCOLIOSIS TYPE, UNSPECIFIED SPINAL REGION: ICD-10-CM

## 2024-05-09 DIAGNOSIS — Z74.09 IMPAIRED MOBILITY AND ADLS: Primary | ICD-10-CM

## 2024-05-09 DIAGNOSIS — K59.00 CONSTIPATION, UNSPECIFIED CONSTIPATION TYPE: ICD-10-CM

## 2024-05-09 DIAGNOSIS — M17.11 OSTEOARTHRITIS OF RIGHT KNEE, UNSPECIFIED OSTEOARTHRITIS TYPE: ICD-10-CM

## 2024-05-09 DIAGNOSIS — F41.1 GENERALIZED ANXIETY DISORDER: ICD-10-CM

## 2024-05-09 PROCEDURE — 99214 OFFICE O/P EST MOD 30 MIN: CPT | Performed by: INTERNAL MEDICINE

## 2024-05-09 PROCEDURE — 1090F PRES/ABSN URINE INCON ASSESS: CPT | Performed by: INTERNAL MEDICINE

## 2024-05-09 PROCEDURE — G8420 CALC BMI NORM PARAMETERS: HCPCS | Performed by: INTERNAL MEDICINE

## 2024-05-09 PROCEDURE — 1123F ACP DISCUSS/DSCN MKR DOCD: CPT | Performed by: INTERNAL MEDICINE

## 2024-05-09 PROCEDURE — G8427 DOCREV CUR MEDS BY ELIG CLIN: HCPCS | Performed by: INTERNAL MEDICINE

## 2024-05-09 PROCEDURE — 1036F TOBACCO NON-USER: CPT | Performed by: INTERNAL MEDICINE

## 2024-05-09 RX ORDER — TRAZODONE HYDROCHLORIDE 50 MG/1
25 TABLET ORAL NIGHTLY
Qty: 90 TABLET | Refills: 1 | Status: SHIPPED | OUTPATIENT
Start: 2024-05-09

## 2024-05-09 RX ORDER — BUSPIRONE HYDROCHLORIDE 10 MG/1
10 TABLET ORAL 4 TIMES DAILY
Qty: 360 TABLET | Refills: 1 | Status: SHIPPED | OUTPATIENT
Start: 2024-05-09

## 2024-05-09 RX ORDER — CARVEDILOL 3.12 MG/1
3.12 TABLET ORAL 2 TIMES DAILY WITH MEALS
Qty: 180 TABLET | Refills: 1 | Status: SHIPPED | OUTPATIENT
Start: 2024-05-09

## 2024-05-09 RX ORDER — TRAZODONE HYDROCHLORIDE 50 MG/1
25 TABLET ORAL NIGHTLY
COMMUNITY
End: 2024-05-09 | Stop reason: SDUPTHER

## 2024-05-09 RX ORDER — LIDOCAINE 50 MG/G
3 PATCH TOPICAL DAILY
Qty: 90 PATCH | Refills: 5 | Status: SHIPPED | OUTPATIENT
Start: 2024-05-09 | End: 2024-11-05

## 2024-05-09 RX ORDER — LISINOPRIL 10 MG/1
10 TABLET ORAL 2 TIMES DAILY
Qty: 180 TABLET | Refills: 2 | Status: SHIPPED | OUTPATIENT
Start: 2024-05-09

## 2024-05-09 ASSESSMENT — MINI MENTAL STATE EXAM
WHAT DAY OF THE WEEK IS THIS?: 1
WHAT IS TODAY'S DATE?: 0
NOW WHAT WERE THE THREE OBJECTS I ASKED YOU TO REMEMBER?: 2
WHAT IS THE NAME OF THIS BUILDING [IN FACILITY]?/WHAT IS THE STREET ADDRESS OF THIS HOUSE [IN HOME]?: 1
ASK THE PERSON IF HE IS RIGHT OR LEFT-HANDED. TAKE A PIECE OF PAPER AND HOLD IT UP IN
FRONT OF THE PERSON. SAY: TAKE THIS PAPER IN YOUR RIGHT/LEFT HAND (WHICHEVER IS NON-
DOMINANT), SCORE IF PAPER IS PICKED UP IN CORRECT HAND.: 1
SAY: I AM GOING TO NAME THREE OBJECTS. WHEN I AM FINISHED, I WANT YOU TO REPEAT
THEM. REMEMBER WHAT THEY ARE BECAUSE I AM GOING TO ASK YOU TO NAME THEM AGAIN IN
A FEW MINUTES.  SAY THE FOLLOWING WORDS SLOWLY AT 1-SECOND INTERVALS - BALL/ CAR/ MAN [ITERATIONS FOR REPEAT ADMINISTRATION]: 3
SHOW: PENCIL [OBJECT] ASK: WHAT IS THIS CALLED?: 1
SAY: READ THE WORDS ON THE PAGE AND THEN DO WHAT IT SAYS. THEN HAND THE PERSON
THE SHEET WITH CLOSE YOUR EYES ON IT. IF THE SUBJECT READS AND DOES NOT CLOSE THEIR EYES, REPEAT UP TO THREE TIMES. SCORE ONLY IF SUBJECT CLOSES EYES.: 1
PLACE DESIGN, ERASER AND PENCIL IN FRONT OF THE PERSON.  SAY:  COPY THIS DESIGN PLEASE.  SHOW: DESIGN. ALLOW: MULTIPLE TRIES. WAIT UNTIL PERSON IS FINISHED AND HANDS IT BACK. SCORE: ONLY FOR DIAGRAM WITH 4-SIDED FIGURE BETWEEN TWO 5-SIDED FIGURES: 1
WHAT MONTH IS THIS?: 0
SHOW: WRISTWATCH [OBJECT] ASK: WHAT IS THIS CALLED?: 1
WHAT CITY/TOWN ARE WE IN?: 1
HAND THE PERSON A PENCIL AND PAPER. SAY: WRITE ANY COMPLETE SENTENCE ON THAT
PIECE OF PAPER. (NOTE: THE SENTENCE MUST MAKE SENSE. IGNORE SPELLING ERRORS): 1
SAY: I WOULD LIKE YOU TO REPEAT THIS PHRASE AFTER ME: NO IFS, ANDS, OR BUTS.: 1
WHAT YEAR IS THIS?: 1
WHAT COUNTRY ARE WE IN?: 1
SAY: I WOULD LIKE YOU TO COUNT BACKWARD FROM 100 BY SEVENS: 3
WHAT STATE [OR PROVINCE] ARE WE IN?: 1
WHICH SEASON IS THIS?: 1
SAY: FOLD THE PAPER IN HALF ONCE WITH BOTH HANDS, SCORE IF PAPER IS CORRECTLY FOLDED IN HALF.: 1
SUM ALL MMSE QUESTIONS FOR TOTAL SCORE [OUT OF 30].: 25
WHAT FLOOR ARE WE ON [IN FACILITY]?/ WHAT ROOM ARE WE IN [IN HOME]?: 1
SAY: PUT THE PAPER DOWN ON THE FLOOR, SCORE IF PAPER IS PLACED BACK ON FLOOR: 1

## 2024-05-10 NOTE — PROGRESS NOTES
Activity    Alcohol use: Yes     Alcohol/week: 2.0 - 3.0 standard drinks of alcohol     Types: 2 - 3 Standard drinks or equivalent per week    Drug use: No    Sexual activity: Not on file   Other Topics Concern    Not on file   Social History Narrative    Not on file     Social Determinants of Health     Financial Resource Strain: Low Risk  (7/17/2023)    Overall Financial Resource Strain (CARDIA)     Difficulty of Paying Living Expenses: Not hard at all   Food Insecurity: Not on file (7/17/2023)   Transportation Needs: Unknown (7/17/2023)    PRAPARE - Transportation     Lack of Transportation (Medical): Not on file     Lack of Transportation (Non-Medical): No   Physical Activity: Insufficiently Active (10/3/2023)    Exercise Vital Sign     Days of Exercise per Week: 5 days     Minutes of Exercise per Session: 20 min   Stress: Not on file   Social Connections: Not on file   Intimate Partner Violence: Not on file   Housing Stability: Unknown (7/17/2023)    Housing Stability Vital Sign     Unable to Pay for Housing in the Last Year: Not on file     Number of Places Lived in the Last Year: Not on file     Unstable Housing in the Last Year: No          Review of Systems  Per HPI      /66   Pulse 85   Temp 98.2 °F (36.8 °C)   Resp 15   Ht 1.676 m (5' 6\")   Wt 65.9 kg (145 lb 3.2 oz)   SpO2 98%   BMI 23.44 kg/m²     Physical Examination:     General appearance - alert, well appearing, and in no distress  Chest - clear to auscultation, no wheezes, rales or rhonchi, symmetric air entry  Heart - normal rate, regular rhythm, normal S1, S2, no murmurs, rubs, clicks or gallops  Abdomen - soft, nontender, nondistended, no masses or organomegaly  Back exam - limited range of motion, pain with motion noted during exam  Neurological - motor and sensory grossly normal bilaterally  Musculoskeletal - abnormal exam of right knee with brace in place and difficulty with range of motion and strength.  Extremities - peripheral

## 2024-05-13 ENCOUNTER — TELEPHONE (OUTPATIENT)
Age: 89
End: 2024-05-13

## 2024-05-13 NOTE — TELEPHONE ENCOUNTER
Verbal order given to Hope for u/a & culture per VORB from SRJ.  Reviewed SRJ respone w/ Hope she voiced understanding and will keep us updated.

## 2024-05-13 NOTE — TELEPHONE ENCOUNTER
Spoke with Hope, nurse w/ [facility].  She wanted to update [facility] that pts daughter Yeimi reported finding her mother having a bowel movement in her front hallway of her home yesterday, apparently Yeimi says this is not the first time this has occurred and that the housekeepers also expressed they must spend extra time cleaning pts home due to having to clean “all of the brown spots”.  Hope requesting order for U/A to r/o UTI as because of her behavior change.  Hope wonders SRJ thoughts and if pt should be considered appropriate to move to healthcare vs independent living.  Pt has appt w/ psychiatrist this week and Hope will forward any new findings, she is also going to check w/ housekeeping to try to see how long this has been going on and if it is only w/ bowel or is it bowel & bladder.

## 2024-05-13 NOTE — TELEPHONE ENCOUNTER
Hope, nurse with [facility] [phone]   Would like to fill in Dr. Franks on patient's current condition and also the possibility of moving her to a different level of care.

## 2024-05-14 ENCOUNTER — TELEPHONE (OUTPATIENT)
Age: 89
End: 2024-05-14

## 2024-05-14 NOTE — TELEPHONE ENCOUNTER
Hope from [facility] called.  She would like to know if we could fax her a copy of patient's MMSE from 5/9/24.  Fax:  [phone]    Hope - [phone]

## 2024-05-14 NOTE — TELEPHONE ENCOUNTER
Kristyn from [facility] called.  She would like patient's office visit notes and labs, from the last year, faxed to her at:  [phone].    OK to send?

## 2024-05-15 NOTE — TELEPHONE ENCOUNTER
Last year office notes, labs, and MMSE faxed to Hope @ [facility] approved by True Franks MD.  Confirmation fax received.

## 2024-05-22 ENCOUNTER — TELEPHONE (OUTPATIENT)
Age: 89
End: 2024-05-22

## 2024-05-22 NOTE — TELEPHONE ENCOUNTER
Kristyn from [facility] called.  She would like to know if we received an order from them for the patient to move to assisted living.  She said it was faxed yesterday.    Kristyn - [phone]

## 2024-05-23 ENCOUNTER — PATIENT MESSAGE (OUTPATIENT)
Age: 89
End: 2024-05-23

## 2024-05-23 RX ORDER — PAROXETINE 10 MG/1
10 TABLET, FILM COATED ORAL DAILY
Qty: 30 TABLET | Refills: 3 | Status: SHIPPED | OUTPATIENT
Start: 2024-05-23

## 2024-06-05 ENCOUNTER — TELEPHONE (OUTPATIENT)
Age: 89
End: 2024-06-05

## 2024-06-05 NOTE — TELEPHONE ENCOUNTER
Attempted to contact Kristyn - unsuccessful.    -Left message to return call.  Pt can use 1-3 patches per day depending on pain level.  Can place order for Prolia w/ [facility] if pt wishes.

## 2024-06-05 NOTE — TELEPHONE ENCOUNTER
Per True Franks MD pt to use up to 3 patches at once depending on pain level.  Pt can have prolia through [facility] if she would rather go there every 6 months vs. Having done at [facility].    Attempted to return call to [place] -  on lunch break - can return call after 1PM.

## 2024-06-05 NOTE — TELEPHONE ENCOUNTER
Kristyn from [facility] called.  She wanted to clarify the directions for patient's Lidocaine Patches.  She would like to know if it is 1 or 3 patches every 12 hours.    She also would like to know if the patient can get her Prolia injections at our office because it would be very costly if she had them done at [facility].    Kristyn - [phone]

## 2024-08-31 ENCOUNTER — APPOINTMENT (OUTPATIENT)
Facility: HOSPITAL | Age: 89
End: 2024-08-31
Payer: MEDICARE

## 2024-08-31 ENCOUNTER — HOSPITAL ENCOUNTER (EMERGENCY)
Facility: HOSPITAL | Age: 89
Discharge: HOME | End: 2024-08-31
Attending: EMERGENCY MEDICINE
Payer: MEDICARE

## 2024-08-31 VITALS
WEIGHT: 158.29 LBS | DIASTOLIC BLOOD PRESSURE: 101 MMHG | SYSTOLIC BLOOD PRESSURE: 154 MMHG | OXYGEN SATURATION: 95 % | RESPIRATION RATE: 20 BRPM | TEMPERATURE: 98.3 F | HEART RATE: 88 BPM | BODY MASS INDEX: 25.55 KG/M2
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
## 2024-08-31 DIAGNOSIS — N39.0 URINARY TRACT INFECTION WITHOUT HEMATURIA, SITE UNSPECIFIED: ICD-10-CM

## 2024-08-31 DIAGNOSIS — H00.015 HORDEOLUM EXTERNUM OF LEFT LOWER EYELID: ICD-10-CM

## 2024-08-31 LAB
APPEARANCE UR: CLEAR
BACTERIA URNS QL MICRO: ABNORMAL /HPF
BILIRUB UR QL: NEGATIVE
COLOR UR: ABNORMAL
EPITH CASTS URNS QL MICRO: ABNORMAL /LPF
GLUCOSE UR STRIP.AUTO-MCNC: NEGATIVE MG/DL
HGB UR QL STRIP: ABNORMAL
KETONES UR QL STRIP.AUTO: NEGATIVE MG/DL
LEUKOCYTE ESTERASE UR QL STRIP.AUTO: ABNORMAL
NITRITE UR QL STRIP.AUTO: NEGATIVE
PH UR STRIP: 6.5 (ref 5–8)
PROT UR STRIP-MCNC: NEGATIVE MG/DL
RBC #/AREA URNS HPF: ABNORMAL /HPF (ref 0–5)
SP GR UR REFRACTOMETRY: 1.01 (ref 1–1.03)
URINE CULTURE IF INDICATED: ABNORMAL
UROBILINOGEN UR QL STRIP.AUTO: 0.2 EU/DL (ref 0.2–1)
WBC URNS QL MICRO: ABNORMAL /HPF (ref 0–4)

## 2024-08-31 PROCEDURE — 72125 CT NECK SPINE W/O DYE: CPT

## 2024-08-31 PROCEDURE — 99284 EMERGENCY DEPT VISIT MOD MDM: CPT

## 2024-08-31 PROCEDURE — 6370000000 HC RX 637 (ALT 250 FOR IP): Performed by: EMERGENCY MEDICINE

## 2024-08-31 PROCEDURE — 70486 CT MAXILLOFACIAL W/O DYE: CPT

## 2024-08-31 PROCEDURE — 81001 URINALYSIS AUTO W/SCOPE: CPT

## 2024-08-31 PROCEDURE — 71046 X-RAY EXAM CHEST 2 VIEWS: CPT

## 2024-08-31 PROCEDURE — 87088 URINE BACTERIA CULTURE: CPT

## 2024-08-31 PROCEDURE — 87186 SC STD MICRODIL/AGAR DIL: CPT

## 2024-08-31 PROCEDURE — 87086 URINE CULTURE/COLONY COUNT: CPT

## 2024-08-31 PROCEDURE — 70450 CT HEAD/BRAIN W/O DYE: CPT

## 2024-08-31 RX ORDER — NITROFURANTOIN 25; 75 MG/1; MG/1
100 CAPSULE ORAL 2 TIMES DAILY
Qty: 10 CAPSULE | Refills: 0 | Status: SHIPPED | OUTPATIENT
Start: 2024-08-31 | End: 2024-09-05

## 2024-08-31 RX ORDER — NITROFURANTOIN 25; 75 MG/1; MG/1
100 CAPSULE ORAL
Status: COMPLETED | OUTPATIENT
Start: 2024-08-31 | End: 2024-08-31

## 2024-08-31 RX ADMIN — NITROFURANTOIN (MONOHYDRATE/MACROCRYSTALS) 100 MG: 75; 25 CAPSULE ORAL at 08:09

## 2024-08-31 ASSESSMENT — PAIN SCALES - GENERAL: PAINLEVEL_OUTOF10: 7

## 2024-08-31 ASSESSMENT — PAIN - FUNCTIONAL ASSESSMENT: PAIN_FUNCTIONAL_ASSESSMENT: 0-10

## 2024-08-31 NOTE — ED PROVIDER NOTES
[facility] EMERGENCY CTR  EMERGENCY DEPARTMENT ENCOUNTER      Pt Name: Nicole Henry  MRN: [id number]  Birthdate 1935  Date of evaluation: 8/31/2024  Provider: Edy Granger MD      HISTORY OF PRESENT ILLNESS      This is a pleasant 88-year-old female with history of breast cancer, gallstones, GERD, hypertension who presents to the emergency department from her assisted living facility by EMS with chief complaint of fall.  She reports she was going to get her rollator and fell this morning landing on her nose.  No LOC.  Has no pain other than her nose.  She thinks she may have some left upper chest wall pain.    The history is provided by the patient, medical records, a relative and the EMS personnel.           Nursing Notes were reviewed.    REVIEW OF SYSTEMS         Review of Systems        PAST MEDICAL HISTORY     Past Medical History:   Diagnosis Date    Age-related osteoporosis without current pathological fracture 09/24/2021    Anxiety     Anxiety state, unspecified 05/03/2010    Atypical chest pain     Breast cancer metastasized to axillary lymph node (HCC) 09/03/2014    Cancer ([facility]) 1998    endometrial cancer and breast    Diverticulosis     DJD (degenerative joint disease)     Endometrial cancer (HCC) 03/20/2014    Gallstones     GERD (gastroesophageal reflux disease)     Hiatal hernia     Hypertension     Nausea & vomiting     OA (osteoarthritis) 05/03/2010    Osteoarthritis of right hip 09/20/2012    PUD (peptic ulcer disease)     BLEEDING AFTER THR    Stenosis, spinal, lumbar          SURGICAL HISTORY       Past Surgical History:   Procedure Laterality Date    BLADDER SUSPENSION      BREAST SURGERY      lumpectomy 05/22/14    CATARACT REMOVAL Bilateral     DILATION AND CURETTAGE OF UTERUS  01/01/1998    GYN  01/01/2003    PELVIC FLOOR RECONSTRUCTION    ORTHOPEDIC SURGERY Right 01/01/2012    THR    OTHER SURGICAL HISTORY  01/01/1999    A&P REPAIR    KS UNLISTED PROCEDURE ABDOMEN PERITONEUM &       Spouse name: None    Number of children: None    Years of education: None    Highest education level: None   Tobacco Use    Smoking status: Never    Smokeless tobacco: Never   Substance and Sexual Activity    Alcohol use: Yes     Alcohol/week: 2.0 - 3.0 standard drinks of alcohol     Types: 2 - 3 Standard drinks or equivalent per week    Drug use: No     Social Determinants of Health     Financial Resource Strain: Low Risk  (7/17/2023)    Overall Financial Resource Strain (CARDIA)     Difficulty of Paying Living Expenses: Not hard at all   Transportation Needs: Unknown (7/17/2023)    PRAPARE - Transportation     Lack of Transportation (Non-Medical): No   Physical Activity: Insufficiently Active (10/3/2023)    Exercise Vital Sign     Days of Exercise per Week: 5 days     Minutes of Exercise per Session: 20 min   Housing Stability: Unknown (7/17/2023)    Housing Stability Vital Sign     Unstable Housing in the Last Year: No         PHYSICAL EXAM       ED Triage Vitals [08/31/24 0620]   BP Systolic BP Percentile Diastolic BP Percentile Temp Temp Source Pulse Respirations SpO2   (!) 146/91 -- -- 98.3 °F (36.8 °C) Oral 88 16 97 %      Height Weight - Scale         -- 71.8 kg (158 lb 4.6 oz)             Body mass index is 25.55 kg/m².    Physical Exam  Vitals and nursing note reviewed.   Constitutional:       Appearance: Normal appearance.   HENT:      Head: Normocephalic.      Nose:      Comments: Anterior nose contusion  Eyes:      Comments: Stye left lower eyelid   Musculoskeletal:      Cervical back: Neck supple. No tenderness.   Neurological:      General: No focal deficit present.      Mental Status: She is alert and oriented to person, place, and time.             EMERGENCY DEPARTMENT COURSE and DIFFERENTIAL DIAGNOSIS/MDM:   Vitals:    Vitals:    08/31/24 0620   BP: (!) 146/91   Pulse: 88   Resp: 16   Temp: 98.3 °F (36.8 °C)   TempSrc: Oral   SpO2: 97%   Weight: 71.8 kg (158 lb 4.6 oz)         Medical

## 2024-08-31 NOTE — DISCHARGE INSTRUCTIONS
Return with any new or worsening symptoms.  Patient should take the antibiotics as directed and also use warm compresses frequently to her left

## 2024-08-31 NOTE — ED PROVIDER NOTES
Patient presented to the emergency department after a ground-level fall.  Signed out to me by Dr. Granger pending results of imaging.  Please see initial notes for details.    Imaging shows no evidence of any acute fractures or injuries.  There is suggestion of a potential UTI on her urinalysis.  Patient's daughter tells me she has had urinary frequency.  Will prescribe Macrobid.  Return precautions discussed and patient discharged in stable condition     David Aden MD  08/31/24 [id number]

## 2024-08-31 NOTE — ED TRIAGE NOTES
Per EMS, patient sustained ground level fall this morning reaching for her rollator.  Pt states she thinks she may have \"blacked out\".  No visible injury noted upon examination

## 2024-09-01 LAB
BACTERIA SPEC CULT: ABNORMAL
CC UR VC: ABNORMAL
SERVICE CMNT-IMP: ABNORMAL

## 2024-09-02 LAB
BACTERIA SPEC CULT: ABNORMAL
CC UR VC: ABNORMAL
SERVICE CMNT-IMP: ABNORMAL

## 2024-11-12 ENCOUNTER — HOSPITAL ENCOUNTER (OUTPATIENT)
Facility: HOSPITAL | Age: 89
Setting detail: INFUSION SERIES
Discharge: HOME | End: 2024-11-12
Payer: MEDICARE

## 2024-11-12 VITALS
RESPIRATION RATE: 18 BRPM | SYSTOLIC BLOOD PRESSURE: 123 MMHG | OXYGEN SATURATION: 98 % | DIASTOLIC BLOOD PRESSURE: 88 MMHG | TEMPERATURE: 97.6 F | HEART RATE: 71 BPM

## 2024-11-12 DIAGNOSIS — M81.0 OSTEOPOROSIS, UNSPECIFIED OSTEOPOROSIS TYPE, UNSPECIFIED PATHOLOGICAL FRACTURE PRESENCE: Primary | ICD-10-CM

## 2024-11-12 PROCEDURE — 96372 THER/PROPH/DIAG INJ SC/IM: CPT

## 2024-11-12 PROCEDURE — 6360000002 HC RX W HCPCS: Performed by: INTERNAL MEDICINE

## 2024-11-12 RX ORDER — SENNA AND DOCUSATE SODIUM 50; 8.6 MG/1; MG/1
1 TABLET, FILM COATED ORAL DAILY
COMMUNITY

## 2024-11-12 RX ORDER — ESCITALOPRAM OXALATE 20 MG/1
20 TABLET ORAL DAILY
COMMUNITY

## 2024-11-12 RX ORDER — HYDROXYZINE HYDROCHLORIDE 25 MG/1
25 TABLET, FILM COATED ORAL
COMMUNITY

## 2024-11-12 RX ADMIN — DENOSUMAB 60 MG: 60 INJECTION SUBCUTANEOUS at 14:18

## 2024-11-12 ASSESSMENT — PAIN DESCRIPTION - LOCATION: LOCATION: TOE (COMMENT WHICH ONE)

## 2024-11-12 ASSESSMENT — PAIN DESCRIPTION - ORIENTATION: ORIENTATION: RIGHT

## 2024-11-12 ASSESSMENT — PAIN SCALES - GENERAL: PAINLEVEL_OUTOF10: 2

## 2024-11-12 ASSESSMENT — PAIN DESCRIPTION - PAIN TYPE: TYPE: ACUTE PAIN

## 2024-11-12 ASSESSMENT — PAIN DESCRIPTION - DESCRIPTORS: DESCRIPTORS: ACHING

## 2024-11-12 NOTE — PROGRESS NOTES
[facility] Peds/Adult Note                       Date: 2024    Name: Nicole Henry    MRN: [id number]         : 1935    1415 Patient arrives for Prolia q6mths without acute problems. Please see EPIC for complete assessment and education provided.    Vital signs stable throughout and prior to discharge. Patient tolerated procedure well and was discharged without incident.  Patient/Family member is aware of no future [facility] appointments.  Will need to follow up with healthcare provider in 6mths for updated order/appointment.  Appointment card give to the Patient/Family member.      Ms. Henry's vitals were reviewed prior to and after treatment.   Patient Vitals for the past 12 hrs:   Temp Pulse Resp BP SpO2   24 1415 97.6 °F (36.4 °C) 71 18 123/88 98 %       Medications given:   Medications Administered         denosumab (PROLIA) SC injection 60 mg Admin Date  2024 Action  Given Dose  60 mg Route  SubCUTAneous Documented By  Nani Talbert, KARLY              Ms. Henry tolerated the treatment and had no complaints.    [facility]. Discharge Instructions provided to patient, patient verbalized understanding.     No future appointments.    Nani Talbert RN  2024  2:33 PM

## 2025-06-12 ENCOUNTER — HOSPITAL ENCOUNTER (OUTPATIENT)
Facility: HOSPITAL | Age: 89
Setting detail: INFUSION SERIES
Discharge: HOME | End: 2025-06-12
Payer: MEDICARE

## 2025-06-12 VITALS
DIASTOLIC BLOOD PRESSURE: 76 MMHG | OXYGEN SATURATION: 97 % | RESPIRATION RATE: 18 BRPM | HEART RATE: 80 BPM | SYSTOLIC BLOOD PRESSURE: 122 MMHG | TEMPERATURE: 98.6 F

## 2025-06-12 DIAGNOSIS — M81.0 OSTEOPOROSIS, UNSPECIFIED OSTEOPOROSIS TYPE, UNSPECIFIED PATHOLOGICAL FRACTURE PRESENCE: Primary | ICD-10-CM

## 2025-06-12 PROCEDURE — 6360000002 HC RX W HCPCS: Performed by: INTERNAL MEDICINE

## 2025-06-12 PROCEDURE — 96372 THER/PROPH/DIAG INJ SC/IM: CPT

## 2025-06-12 RX ADMIN — DENOSUMAB 60 MG: 60 INJECTION SUBCUTANEOUS at 15:35

## 2025-06-12 ASSESSMENT — PAIN DESCRIPTION - LOCATION: LOCATION: KNEE;SHOULDER

## 2025-06-12 ASSESSMENT — PAIN DESCRIPTION - PAIN TYPE: TYPE: CHRONIC PAIN

## 2025-06-12 ASSESSMENT — PAIN DESCRIPTION - DESCRIPTORS: DESCRIPTORS: ACHING

## 2025-06-12 ASSESSMENT — PAIN DESCRIPTION - ORIENTATION: ORIENTATION: RIGHT;LEFT

## 2025-06-12 ASSESSMENT — PAIN SCALES - GENERAL: PAINLEVEL_OUTOF10: 5

## 2025-06-12 NOTE — PROGRESS NOTES
\[facility]\"" Peds/Adult Note                       Date: 2025    Name: Nicole Henry    MRN: [id number]         : 1935    1520 Patient arrives for Prolia Q 6 Months without acute problems. Please see Epic for complete assessment and education provided.    Vital signs stable throughout and prior to discharge. Patient tolerated procedure well and was discharged without incident.  Patient/Daughter is aware of next \[facility]\"" appointment on 12/15/2025.  Appointment card give to the Patient.       Ms. Henry's vitals were reviewed prior to and after treatment.   Patient Vitals for the past 12 hrs:   Temp Pulse Resp BP SpO2   25 1520 98.6 °F (37 °C) 80 18 122/76 97 %         Lab results were obtained and reviewed.  Labs obtained 2025.     Medications given:   Medications Administered         denosumab (PROLIA) SC injection 60 mg Admin Date  2025 Action  Given Dose  60 mg Route  SubCUTAneous Documented By  Rama Ogden RN          Ms. Henry tolerated the SQ Injection without difficulty.     [facility].     Future Appointments   Date Time Provider Department Center   12/15/2025  3:00 PM PEDS INF CHAIR 1 [facility]       RAMA OGDEN RN  2025  3:56 PM